# Patient Record
Sex: FEMALE | Race: WHITE | Employment: UNEMPLOYED | URBAN - METROPOLITAN AREA
[De-identification: names, ages, dates, MRNs, and addresses within clinical notes are randomized per-mention and may not be internally consistent; named-entity substitution may affect disease eponyms.]

---

## 2019-06-20 ENCOUNTER — APPOINTMENT (OUTPATIENT)
Dept: GENERAL RADIOLOGY | Age: 84
End: 2019-06-20
Payer: MEDICARE

## 2019-06-20 ENCOUNTER — HOSPITAL ENCOUNTER (EMERGENCY)
Age: 84
Discharge: HOME | End: 2019-06-20
Attending: EMERGENCY MEDICINE
Payer: MEDICARE

## 2019-06-20 VITALS
DIASTOLIC BLOOD PRESSURE: 82 MMHG | HEART RATE: 57 BPM | SYSTOLIC BLOOD PRESSURE: 156 MMHG | TEMPERATURE: 98.1 F | RESPIRATION RATE: 14 BRPM

## 2019-06-20 DIAGNOSIS — M25.571 ACUTE RIGHT ANKLE PAIN: Primary | ICD-10-CM

## 2019-06-20 PROCEDURE — 99283 EMERGENCY DEPT VISIT LOW MDM: CPT

## 2019-06-20 PROCEDURE — 73600 X-RAY EXAM OF ANKLE: CPT

## 2019-06-20 PROCEDURE — 73620 X-RAY EXAM OF FOOT: CPT

## 2019-06-20 RX ORDER — LISINOPRIL 40 MG/1
40 TABLET ORAL DAILY
Status: ON HOLD | COMMUNITY
End: 2019-12-06 | Stop reason: SDUPTHER

## 2019-06-20 ASSESSMENT — ENCOUNTER SYMPTOMS
EYE DISCHARGE: 0
ABDOMINAL DISTENTION: 0
ABDOMINAL PAIN: 0
BACK PAIN: 0
EYE PAIN: 0
FACIAL SWELLING: 0
CHEST TIGHTNESS: 0
SHORTNESS OF BREATH: 0

## 2019-06-20 ASSESSMENT — PAIN SCALES - GENERAL: PAINLEVEL_OUTOF10: 5

## 2019-06-20 NOTE — ED NOTES
Brought into ec by private auto with . Advised by patient that several days ago she was doing flexion/extension type exercises to her feet and experienced  Onset of pain to the dorsal aspect of right foot. claims that pain has remained constant and has increased in intensity over the past 24 hours. States that she now has difficulty walking because weight bearing causes  Increased pain to right foot. ;  . Pain localized to dorsal and bottom of right foot.       Adithya Mcneill RN  06/20/19 7143

## 2019-06-20 NOTE — ED NOTES
Patient re-evaluated  doctor leandro. Patient has difficulty with ambulation  Because of intense right foot pain. Ace bandage and fracture shoe applied to right foot. Patient given a rx of walker for home use.       Lg Fowler RN  06/20/19 0226

## 2019-06-20 NOTE — ED PROVIDER NOTES
EMERGENCY DEPARTMENT ENCOUNTER    Pt Name: Baldomero Shea  MRN: [id number]  [place] 1934  Date of evaluation: 6/20/19  CHIEF COMPLAINT       Chief Complaint   Patient presents with    Leg Pain     right foot after stretching foot several days ago     HISTORY OF PRESENT ILLNESS   HPI   Patient is a 70-year-old female presented to the department secondary to right foot and ankle pain. Patient she was scratching her foot yesterday she possibly hit while outside noticed increased pain localized to her right foot 5 out of 10 on the pain scale. She denied a previous history of gout or osteoarthritis. Patient does admit being out yesterday and sustained a sunburn. Patient ambulates without assistance of wheelchair or cane. Patient denies chest pain, shortness of breath, nausea, vomiting, fevers or chills. REVIEW OF SYSTEMS     Review of Systems   Constitutional: Negative for chills, diaphoresis and fever. HENT: Negative for congestion, ear pain and facial swelling. Eyes: Negative for pain, discharge and visual disturbance. Respiratory: Negative for chest tightness and shortness of breath. Cardiovascular: Negative for chest pain and palpitations. Gastrointestinal: Negative for abdominal distention and abdominal pain. Genitourinary: Negative for difficulty urinating and flank pain. Musculoskeletal: Negative for back pain. Right foot and ankle pain   Skin: Negative for wound. Neurological: Negative for dizziness, light-headedness and headaches.      PASTMEDICAL HISTORY     Past Medical History:   Diagnosis Date    Atrial fibrillation ([address]     CAD (coronary artery disease)     Hyperlipidemia     Hypertension     S/P cardiac cath      SURGICAL HISTORY       Past Surgical History:   Procedure Laterality Date    COLONOSCOPY      PACEMAKER PLACEMENT       CURRENT MEDICATIONS       Discharge Medication List as of 6/20/2019  9:50 AM      CONTINUE these medications which have NOT CHANGED Details   lisinopril (PRINIVIL;ZESTRIL) 40 MG tablet Take 40 mg by mouth dailyHistorical Med      simvastatin (ZOCOR) 20 MG tablet Take 20 mg by mouth nightly      sotalol (BETAPACE) 80 MG tablet Take 80 mg by mouth 2 times daily      warfarin (COUMADIN) 5 MG tablet Take 5 mg by mouth           ALLERGIES     has No Known Allergies. FAMILY HISTORY     has no family status information on file. SOCIAL HISTORY       Social History     Tobacco Use    Smoking status: Former Smoker   Substance Use Topics    Alcohol use: No    Drug use: Never     PHYSICAL EXAM     INITIAL VITALS: BP (!) 156/82   Pulse 57   Temp 98.1 °F (36.7 °C) (Oral)   Resp 14    Physical Exam   Constitutional: She is oriented to person, place, and time. She appears well-developed and well-nourished. No distress. HENT:   Head: Normocephalic and atraumatic. Eyes: Pupils are equal, round, and reactive to light. EOM are normal.   Neck: Normal range of motion. Neck supple. Cardiovascular: Normal rate and regular rhythm. Pulmonary/Chest: Effort normal and breath sounds normal.   Abdominal: Soft. Bowel sounds are normal.   Musculoskeletal: Normal range of motion. She exhibits no edema or deformity. Neurological: She is alert and oriented to person, place, and time. Skin: Skin is dry. Capillary refill takes 2 to 3 seconds. She is not diaphoretic. Psychiatric: She has a normal mood and affect. Nursing note and vitals reviewed. MEDICAL DECISION MAKING:   The patient was seen and examined. Patient is a 40-year-old female who presented to the emergency department secondary to right foot pain. 2 diagnoses include but not limited to occult fracture versus dislocation versus musculoskeletal strain. X-rays obtained of the right foot and ankle. Patient was offered analgesia she declined. Imaging no acute findings, patient unable to bear weight she was placed in a Ace wrap and a postop shoe.   She did not feel comfortable discharge home with crutches but she did request a discharge prescription for a wheelchair. Discharged home with outpatient follow-up and given parameters for return to the emergency department. CRITICAL CARE:              NIH STROKE SCALE:            PROCEDURES:    Procedures    DIAGNOSTIC RESULTS   EKG:All EKG's are interpreted by the Emergency Department Physician who either signs or Co-signs this chart in the absence of a cardiologist.        RADIOLOGY:All plain film, CT, MRI, and formal ultrasound images (except ED bedside ultrasound) are read by the radiologist, see reports below, unless otherwisenoted in MDM or here. XR ANKLE RIGHT (2 VIEWS)   Final Result   1. No acute osseous abnormality of the right foot and ankle. 2. Moderate ankle soft tissue swelling. 3. Diffuse bony demineralization. XR FOOT RIGHT (2 VIEWS)   Final Result   1. No acute osseous abnormality of the right foot and ankle. 2. Moderate ankle soft tissue swelling. 3. Diffuse bony demineralization. LABS: All lab results were reviewed by myself, and all abnormals are listed below. Labs Reviewed - No data to display    EMERGENCY DEPARTMENTCOURSE:         Vitals:    Vitals:    06/20/19 0829   BP: (!) 156/82   Pulse: 57   Resp: 14   Temp: 98.1 °F (36.7 °C)   TempSrc: Oral       The patient was given the following medications while in the emergency department:  No orders of the defined types were placed in this encounter. CONSULTS:  None    FINAL IMPRESSION      1.  Acute right ankle pain          DISPOSITION/PLAN   DISPOSITION        PATIENT REFERRED TO:  Walt Montilla MD  [address] [place] [address]  [phone]    Schedule an appointment as soon as possible for a visit       DISCHARGE MEDICATIONS:  Discharge Medication List as of 6/20/2019  5:91 AM        Itzel Bui MD  Attending Emergency Physician                    Itzel Bui MD  30/94/48 Ana Luisa Ha MD  07/86/76 0186

## 2019-12-05 ENCOUNTER — APPOINTMENT (OUTPATIENT)
Dept: GENERAL RADIOLOGY | Age: 84
DRG: 308 | End: 2019-12-05
Payer: MEDICARE

## 2019-12-05 ENCOUNTER — HOSPITAL ENCOUNTER (INPATIENT)
Age: 84
LOS: 1 days | Discharge: HOME | DRG: 308 | End: 2019-12-06
Attending: EMERGENCY MEDICINE | Admitting: INTERNAL MEDICINE
Payer: MEDICARE

## 2019-12-05 ENCOUNTER — APPOINTMENT (OUTPATIENT)
Dept: CT IMAGING | Age: 84
DRG: 308 | End: 2019-12-05
Payer: MEDICARE

## 2019-12-05 DIAGNOSIS — J44.1 COPD EXACERBATION (HCC): ICD-10-CM

## 2019-12-05 DIAGNOSIS — I48.91 ATRIAL FIBRILLATION WITH RVR (HCC): Primary | ICD-10-CM

## 2019-12-05 PROBLEM — I50.31 ACUTE DIASTOLIC CHF (CONGESTIVE HEART FAILURE) (HCC): Status: ACTIVE | Noted: 2019-12-05

## 2019-12-05 PROBLEM — Z91.14 NONCOMPLIANCE WITH MEDICATIONS: Status: ACTIVE | Noted: 2019-12-05

## 2019-12-05 LAB
ABSOLUTE EOS #: 0.03 K/UL (ref 0–0.44)
ABSOLUTE IMMATURE GRANULOCYTE: 0.04 K/UL (ref 0–0.3)
ABSOLUTE LYMPH #: 0.86 K/UL (ref 1.1–3.7)
ABSOLUTE MONO #: 0.6 K/UL (ref 0.1–1.2)
ANION GAP SERPL CALCULATED.3IONS-SCNC: 14 MMOL/L (ref 9–17)
BASOPHILS # BLD: 0 % (ref 0–2)
BASOPHILS ABSOLUTE: 0.03 K/UL (ref 0–0.2)
BNP INTERPRETATION: ABNORMAL
BUN BLDV-MCNC: 13 MG/DL (ref 8–23)
BUN/CREAT BLD: 17 (ref 9–20)
CALCIUM SERPL-MCNC: 9.7 MG/DL (ref 8.6–10.4)
CHLORIDE BLD-SCNC: 106 MMOL/L (ref 98–107)
CO2: 21 MMOL/L (ref 20–31)
CREAT SERPL-MCNC: 0.76 MG/DL (ref 0.5–0.9)
DIFFERENTIAL TYPE: ABNORMAL
EOSINOPHILS RELATIVE PERCENT: 0 % (ref 1–4)
GFR AFRICAN AMERICAN: >60 ML/MIN
GFR NON-AFRICAN AMERICAN: >60 ML/MIN
GFR SERPL CREATININE-BSD FRML MDRD: ABNORMAL ML/MIN/{1.73_M2}
GFR SERPL CREATININE-BSD FRML MDRD: ABNORMAL ML/MIN/{1.73_M2}
GLUCOSE BLD-MCNC: 157 MG/DL (ref 70–99)
HCT VFR BLD CALC: 48.2 % (ref 36.3–47.1)
HEMOGLOBIN: 15.4 G/DL (ref 11.9–15.1)
IMMATURE GRANULOCYTES: 0 %
INR BLD: 1.7
LV EF: 55 %
LVEF MODALITY: NORMAL
LYMPHOCYTES # BLD: 10 % (ref 24–43)
MCH RBC QN AUTO: 32.2 PG (ref 25.2–33.5)
MCHC RBC AUTO-ENTMCNC: 32 G/DL (ref 28.4–34.8)
MCV RBC AUTO: 100.8 FL (ref 82.6–102.9)
MONOCYTES # BLD: 7 % (ref 3–12)
MYOGLOBIN: 73 NG/ML (ref 25–58)
NRBC AUTOMATED: 0 PER 100 WBC
PDW BLD-RTO: 13.6 % (ref 11.8–14.4)
PLATELET # BLD: 160 K/UL (ref 138–453)
PLATELET ESTIMATE: ABNORMAL
PMV BLD AUTO: 12 FL (ref 8.1–13.5)
POTASSIUM SERPL-SCNC: 4.1 MMOL/L (ref 3.7–5.3)
PRO-BNP: 3624 PG/ML
PROTHROMBIN TIME: 17.4 SEC (ref 9.7–11.6)
RBC # BLD: 4.78 M/UL (ref 3.95–5.11)
RBC # BLD: ABNORMAL 10*6/UL
SEG NEUTROPHILS: 83 % (ref 36–65)
SEGMENTED NEUTROPHILS ABSOLUTE COUNT: 7.44 K/UL (ref 1.5–8.1)
SODIUM BLD-SCNC: 141 MMOL/L (ref 135–144)
TROPONIN INTERP: ABNORMAL
TROPONIN T: ABNORMAL NG/ML
TROPONIN, HIGH SENSITIVITY: 23 NG/L (ref 0–14)
TROPONIN, HIGH SENSITIVITY: 28 NG/L (ref 0–14)
TROPONIN, HIGH SENSITIVITY: 28 NG/L (ref 0–14)
TROPONIN, HIGH SENSITIVITY: 32 NG/L (ref 0–14)
WBC # BLD: 9 K/UL (ref 3.5–11.3)
WBC # BLD: ABNORMAL 10*3/UL

## 2019-12-05 PROCEDURE — 99222 1ST HOSP IP/OBS MODERATE 55: CPT | Performed by: INTERNAL MEDICINE

## 2019-12-05 PROCEDURE — 2060000000 HC ICU INTERMEDIATE R&B

## 2019-12-05 PROCEDURE — 6370000000 HC RX 637 (ALT 250 FOR IP): Performed by: NURSE PRACTITIONER

## 2019-12-05 PROCEDURE — 6360000002 HC RX W HCPCS: Performed by: NURSE PRACTITIONER

## 2019-12-05 PROCEDURE — 85025 COMPLETE CBC W/AUTO DIFF WBC: CPT

## 2019-12-05 PROCEDURE — 96365 THER/PROPH/DIAG IV INF INIT: CPT

## 2019-12-05 PROCEDURE — 2580000003 HC RX 258: Performed by: NURSE PRACTITIONER

## 2019-12-05 PROCEDURE — 83880 ASSAY OF NATRIURETIC PEPTIDE: CPT

## 2019-12-05 PROCEDURE — 2500000003 HC RX 250 WO HCPCS: Performed by: NURSE PRACTITIONER

## 2019-12-05 PROCEDURE — 99285 EMERGENCY DEPT VISIT HI MDM: CPT

## 2019-12-05 PROCEDURE — 36415 COLL VENOUS BLD VENIPUNCTURE: CPT

## 2019-12-05 PROCEDURE — 70450 CT HEAD/BRAIN W/O DYE: CPT

## 2019-12-05 PROCEDURE — 6360000002 HC RX W HCPCS: Performed by: INTERNAL MEDICINE

## 2019-12-05 PROCEDURE — 93005 ELECTROCARDIOGRAM TRACING: CPT | Performed by: NURSE PRACTITIONER

## 2019-12-05 PROCEDURE — 71045 X-RAY EXAM CHEST 1 VIEW: CPT

## 2019-12-05 PROCEDURE — 83874 ASSAY OF MYOGLOBIN: CPT

## 2019-12-05 PROCEDURE — 85610 PROTHROMBIN TIME: CPT

## 2019-12-05 PROCEDURE — 84484 ASSAY OF TROPONIN QUANT: CPT

## 2019-12-05 PROCEDURE — 80048 BASIC METABOLIC PNL TOTAL CA: CPT

## 2019-12-05 PROCEDURE — 96375 TX/PRO/DX INJ NEW DRUG ADDON: CPT

## 2019-12-05 PROCEDURE — 96376 TX/PRO/DX INJ SAME DRUG ADON: CPT

## 2019-12-05 PROCEDURE — APPSS180 APP SPLIT SHARED TIME > 60 MINUTES: Performed by: NURSE PRACTITIONER

## 2019-12-05 PROCEDURE — 93306 TTE W/DOPPLER COMPLETE: CPT

## 2019-12-05 RX ORDER — SIMVASTATIN 20 MG
20 TABLET ORAL NIGHTLY
Status: ON HOLD | COMMUNITY
End: 2019-12-06 | Stop reason: HOSPADM

## 2019-12-05 RX ORDER — SODIUM CHLORIDE 0.9 % (FLUSH) 0.9 %
10 SYRINGE (ML) INJECTION EVERY 12 HOURS SCHEDULED
Status: DISCONTINUED | OUTPATIENT
Start: 2019-12-05 | End: 2019-12-06 | Stop reason: HOSPADM

## 2019-12-05 RX ORDER — METOPROLOL TARTRATE 5 MG/5ML
5 INJECTION INTRAVENOUS EVERY 6 HOURS PRN
Status: DISCONTINUED | OUTPATIENT
Start: 2019-12-05 | End: 2019-12-06 | Stop reason: HOSPADM

## 2019-12-05 RX ORDER — NICOTINE 21 MG/24HR
1 PATCH, TRANSDERMAL 24 HOURS TRANSDERMAL DAILY PRN
Status: DISCONTINUED | OUTPATIENT
Start: 2019-12-05 | End: 2019-12-06 | Stop reason: HOSPADM

## 2019-12-05 RX ORDER — LORAZEPAM 2 MG/ML
0.5 INJECTION INTRAMUSCULAR ONCE
Status: DISCONTINUED | OUTPATIENT
Start: 2019-12-05 | End: 2019-12-05

## 2019-12-05 RX ORDER — VERAPAMIL HYDROCHLORIDE 120 MG/1
120 TABLET, FILM COATED ORAL 3 TIMES DAILY
COMMUNITY
End: 2019-12-05

## 2019-12-05 RX ORDER — DILTIAZEM HYDROCHLORIDE 5 MG/ML
5 INJECTION INTRAVENOUS ONCE
Status: COMPLETED | OUTPATIENT
Start: 2019-12-05 | End: 2019-12-05

## 2019-12-05 RX ORDER — FUROSEMIDE 10 MG/ML
20 INJECTION INTRAMUSCULAR; INTRAVENOUS DAILY
Status: DISCONTINUED | OUTPATIENT
Start: 2019-12-05 | End: 2019-12-06

## 2019-12-05 RX ORDER — ALBUTEROL SULFATE 2.5 MG/3ML
2.5 SOLUTION RESPIRATORY (INHALATION)
Status: DISCONTINUED | OUTPATIENT
Start: 2019-12-05 | End: 2019-12-06 | Stop reason: HOSPADM

## 2019-12-05 RX ORDER — METOPROLOL TARTRATE 50 MG/1
50 TABLET, FILM COATED ORAL 2 TIMES DAILY
Status: ON HOLD | COMMUNITY
End: 2019-12-06 | Stop reason: HOSPADM

## 2019-12-05 RX ORDER — ACETAMINOPHEN 325 MG/1
650 TABLET ORAL EVERY 6 HOURS PRN
Status: DISCONTINUED | OUTPATIENT
Start: 2019-12-05 | End: 2019-12-06 | Stop reason: HOSPADM

## 2019-12-05 RX ORDER — ALBUTEROL SULFATE 90 UG/1
2 AEROSOL, METERED RESPIRATORY (INHALATION)
Status: DISCONTINUED | OUTPATIENT
Start: 2019-12-05 | End: 2019-12-05

## 2019-12-05 RX ORDER — ONDANSETRON 4 MG/1
4 TABLET, ORALLY DISINTEGRATING ORAL EVERY 6 HOURS PRN
Status: DISCONTINUED | OUTPATIENT
Start: 2019-12-05 | End: 2019-12-05

## 2019-12-05 RX ORDER — WARFARIN SODIUM 5 MG/1
5 TABLET ORAL DAILY
Status: DISCONTINUED | OUTPATIENT
Start: 2019-12-05 | End: 2019-12-06 | Stop reason: HOSPADM

## 2019-12-05 RX ORDER — MORPHINE SULFATE 4 MG/ML
4 INJECTION, SOLUTION INTRAMUSCULAR; INTRAVENOUS ONCE
Status: DISCONTINUED | OUTPATIENT
Start: 2019-12-05 | End: 2019-12-05

## 2019-12-05 RX ORDER — METOPROLOL SUCCINATE 100 MG/1
100 TABLET, EXTENDED RELEASE ORAL DAILY
COMMUNITY
End: 2019-12-05

## 2019-12-05 RX ORDER — DILTIAZEM HYDROCHLORIDE 240 MG/1
240 CAPSULE, COATED, EXTENDED RELEASE ORAL DAILY
COMMUNITY
End: 2019-12-05

## 2019-12-05 RX ORDER — SODIUM CHLORIDE 0.9 % (FLUSH) 0.9 %
10 SYRINGE (ML) INJECTION PRN
Status: DISCONTINUED | OUTPATIENT
Start: 2019-12-05 | End: 2019-12-06 | Stop reason: HOSPADM

## 2019-12-05 RX ORDER — DOXYCYCLINE HYCLATE 50 MG/1
50 CAPSULE ORAL DAILY
Status: ON HOLD | COMMUNITY
End: 2021-09-10 | Stop reason: HOSPADM

## 2019-12-05 RX ORDER — SIMVASTATIN 20 MG
20 TABLET ORAL NIGHTLY
Status: DISCONTINUED | OUTPATIENT
Start: 2019-12-05 | End: 2019-12-06 | Stop reason: DRUGHIGH

## 2019-12-05 RX ORDER — IPRATROPIUM BROMIDE AND ALBUTEROL SULFATE 2.5; .5 MG/3ML; MG/3ML
1 SOLUTION RESPIRATORY (INHALATION)
Status: DISCONTINUED | OUTPATIENT
Start: 2019-12-05 | End: 2019-12-05

## 2019-12-05 RX ORDER — METHYLPREDNISOLONE SODIUM SUCCINATE 125 MG/2ML
125 INJECTION, POWDER, LYOPHILIZED, FOR SOLUTION INTRAMUSCULAR; INTRAVENOUS ONCE
Status: COMPLETED | OUTPATIENT
Start: 2019-12-05 | End: 2019-12-05

## 2019-12-05 RX ORDER — ONDANSETRON 2 MG/ML
4 INJECTION INTRAMUSCULAR; INTRAVENOUS EVERY 6 HOURS PRN
Status: DISCONTINUED | OUTPATIENT
Start: 2019-12-05 | End: 2019-12-05

## 2019-12-05 RX ORDER — ONDANSETRON 2 MG/ML
4 INJECTION INTRAMUSCULAR; INTRAVENOUS EVERY 6 HOURS PRN
Status: DISCONTINUED | OUTPATIENT
Start: 2019-12-05 | End: 2019-12-05 | Stop reason: CLARIF

## 2019-12-05 RX ORDER — DILTIAZEM HYDROCHLORIDE 5 MG/ML
10 INJECTION INTRAVENOUS ONCE
Status: COMPLETED | OUTPATIENT
Start: 2019-12-05 | End: 2019-12-05

## 2019-12-05 RX ORDER — ACETAMINOPHEN 325 MG/1
650 TABLET ORAL EVERY 6 HOURS PRN
Status: ON HOLD | COMMUNITY
End: 2021-09-10

## 2019-12-05 RX ORDER — HYDROCHLOROTHIAZIDE 25 MG/1
25 TABLET ORAL DAILY
Status: ON HOLD | COMMUNITY
End: 2021-09-10 | Stop reason: HOSPADM

## 2019-12-05 RX ORDER — ALBUTEROL SULFATE 2.5 MG/3ML
5 SOLUTION RESPIRATORY (INHALATION)
Status: DISCONTINUED | OUTPATIENT
Start: 2019-12-05 | End: 2019-12-05

## 2019-12-05 RX ADMIN — ENOXAPARIN SODIUM 50 MG: 60 INJECTION SUBCUTANEOUS at 18:52

## 2019-12-05 RX ADMIN — DILTIAZEM HYDROCHLORIDE 10 MG: 5 INJECTION INTRAVENOUS at 08:25

## 2019-12-05 RX ADMIN — FUROSEMIDE 20 MG: 10 INJECTION, SOLUTION INTRAMUSCULAR; INTRAVENOUS at 13:31

## 2019-12-05 RX ADMIN — DILTIAZEM HYDROCHLORIDE 5 MG: 5 INJECTION INTRAVENOUS at 10:13

## 2019-12-05 RX ADMIN — DRONEDARONE 400 MG: 400 TABLET, FILM COATED ORAL at 17:06

## 2019-12-05 RX ADMIN — METOPROLOL TARTRATE 5 MG: 5 INJECTION INTRAVENOUS at 13:31

## 2019-12-05 RX ADMIN — ENOXAPARIN SODIUM 30 MG: 30 INJECTION SUBCUTANEOUS at 17:06

## 2019-12-05 RX ADMIN — DILTIAZEM HYDROCHLORIDE 15 MG/HR: 5 INJECTION INTRAVENOUS at 18:51

## 2019-12-05 RX ADMIN — METHYLPREDNISOLONE SODIUM SUCCINATE 125 MG: 125 INJECTION, POWDER, FOR SOLUTION INTRAMUSCULAR; INTRAVENOUS at 08:24

## 2019-12-05 RX ADMIN — DILTIAZEM HYDROCHLORIDE 5 MG/HR: 5 INJECTION INTRAVENOUS at 10:15

## 2019-12-05 RX ADMIN — WARFARIN SODIUM 5 MG: 5 TABLET ORAL at 17:06

## 2019-12-05 RX ADMIN — SIMVASTATIN 20 MG: 20 TABLET, FILM COATED ORAL at 20:31

## 2019-12-05 ASSESSMENT — ENCOUNTER SYMPTOMS
DIARRHEA: 0
ABDOMINAL PAIN: 0
SINUS PRESSURE: 0
CHEST TIGHTNESS: 1
SHORTNESS OF BREATH: 1
COLOR CHANGE: 0
COUGH: 0
VOMITING: 0
NAUSEA: 0
RHINORRHEA: 0
SORE THROAT: 0
WHEEZING: 1

## 2019-12-05 ASSESSMENT — PAIN SCALES - GENERAL: PAINLEVEL_OUTOF10: 0

## 2019-12-06 VITALS
WEIGHT: 178.8 LBS | SYSTOLIC BLOOD PRESSURE: 109 MMHG | HEART RATE: 89 BPM | OXYGEN SATURATION: 93 % | RESPIRATION RATE: 16 BRPM | HEIGHT: 66 IN | TEMPERATURE: 97 F | DIASTOLIC BLOOD PRESSURE: 67 MMHG | BODY MASS INDEX: 28.73 KG/M2

## 2019-12-06 LAB
ALBUMIN SERPL-MCNC: 3.9 G/DL (ref 3.5–5.2)
ALBUMIN/GLOBULIN RATIO: ABNORMAL (ref 1–2.5)
ALP BLD-CCNC: 61 U/L (ref 35–104)
ALT SERPL-CCNC: 30 U/L (ref 5–33)
ANION GAP SERPL CALCULATED.3IONS-SCNC: 14 MMOL/L (ref 9–17)
AST SERPL-CCNC: 27 U/L
BILIRUB SERPL-MCNC: 0.7 MG/DL (ref 0.3–1.2)
BNP INTERPRETATION: ABNORMAL
BUN BLDV-MCNC: 17 MG/DL (ref 8–23)
BUN/CREAT BLD: 21 (ref 9–20)
CALCIUM SERPL-MCNC: 9.3 MG/DL (ref 8.6–10.4)
CHLORIDE BLD-SCNC: 102 MMOL/L (ref 98–107)
CHOLESTEROL/HDL RATIO: 2.9
CHOLESTEROL: 165 MG/DL
CO2: 21 MMOL/L (ref 20–31)
CREAT SERPL-MCNC: 0.82 MG/DL (ref 0.5–0.9)
EKG ATRIAL RATE: 68 BPM
EKG Q-T INTERVAL: 292 MS
EKG QRS DURATION: 116 MS
EKG QTC CALCULATION (BAZETT): 450 MS
EKG R AXIS: -55 DEGREES
EKG T AXIS: 112 DEGREES
EKG VENTRICULAR RATE: 143 BPM
ESTIMATED AVERAGE GLUCOSE: 131 MG/DL
GFR AFRICAN AMERICAN: >60 ML/MIN
GFR NON-AFRICAN AMERICAN: >60 ML/MIN
GFR SERPL CREATININE-BSD FRML MDRD: ABNORMAL ML/MIN/{1.73_M2}
GFR SERPL CREATININE-BSD FRML MDRD: ABNORMAL ML/MIN/{1.73_M2}
GLUCOSE BLD-MCNC: 194 MG/DL (ref 70–99)
HBA1C MFR BLD: 6.2 % (ref 4–6)
HDLC SERPL-MCNC: 57 MG/DL
INR BLD: 2.2
LDL CHOLESTEROL: 91 MG/DL (ref 0–130)
MAGNESIUM: 2 MG/DL (ref 1.6–2.6)
POTASSIUM SERPL-SCNC: 4 MMOL/L (ref 3.7–5.3)
PRO-BNP: 2287 PG/ML
PROTHROMBIN TIME: 22 SEC (ref 9.7–11.6)
SODIUM BLD-SCNC: 137 MMOL/L (ref 135–144)
TOTAL PROTEIN: 6.2 G/DL (ref 6.4–8.3)
TRIGL SERPL-MCNC: 84 MG/DL
TSH SERPL DL<=0.05 MIU/L-ACNC: 2.49 MIU/L (ref 0.3–5)
VLDLC SERPL CALC-MCNC: NORMAL MG/DL (ref 1–30)

## 2019-12-06 PROCEDURE — 6360000002 HC RX W HCPCS: Performed by: INTERNAL MEDICINE

## 2019-12-06 PROCEDURE — 2500000003 HC RX 250 WO HCPCS: Performed by: NURSE PRACTITIONER

## 2019-12-06 PROCEDURE — 83735 ASSAY OF MAGNESIUM: CPT

## 2019-12-06 PROCEDURE — 85610 PROTHROMBIN TIME: CPT

## 2019-12-06 PROCEDURE — 6370000000 HC RX 637 (ALT 250 FOR IP): Performed by: NURSE PRACTITIONER

## 2019-12-06 PROCEDURE — 80061 LIPID PANEL: CPT

## 2019-12-06 PROCEDURE — 6370000000 HC RX 637 (ALT 250 FOR IP): Performed by: INTERNAL MEDICINE

## 2019-12-06 PROCEDURE — 84443 ASSAY THYROID STIM HORMONE: CPT

## 2019-12-06 PROCEDURE — 99239 HOSP IP/OBS DSCHRG MGMT >30: CPT | Performed by: INTERNAL MEDICINE

## 2019-12-06 PROCEDURE — 6360000002 HC RX W HCPCS: Performed by: NURSE PRACTITIONER

## 2019-12-06 PROCEDURE — 83880 ASSAY OF NATRIURETIC PEPTIDE: CPT

## 2019-12-06 PROCEDURE — 80053 COMPREHEN METABOLIC PANEL: CPT

## 2019-12-06 PROCEDURE — 83036 HEMOGLOBIN GLYCOSYLATED A1C: CPT

## 2019-12-06 PROCEDURE — 2580000003 HC RX 258: Performed by: NURSE PRACTITIONER

## 2019-12-06 PROCEDURE — 36415 COLL VENOUS BLD VENIPUNCTURE: CPT

## 2019-12-06 RX ORDER — LISINOPRIL 40 MG/1
20 TABLET ORAL DAILY
Qty: 30 TABLET | Refills: 3 | Status: ON HOLD | OUTPATIENT
Start: 2019-12-06 | End: 2021-09-10 | Stop reason: SDUPTHER

## 2019-12-06 RX ORDER — VERAPAMIL HYDROCHLORIDE 240 MG/1
240 TABLET, FILM COATED, EXTENDED RELEASE ORAL NIGHTLY
Status: DISCONTINUED | OUTPATIENT
Start: 2019-12-06 | End: 2019-12-06 | Stop reason: HOSPADM

## 2019-12-06 RX ORDER — ATORVASTATIN CALCIUM 20 MG/1
20 TABLET, FILM COATED ORAL NIGHTLY
Qty: 30 TABLET | Refills: 3 | Status: SHIPPED | OUTPATIENT
Start: 2019-12-06

## 2019-12-06 RX ORDER — SIMVASTATIN 10 MG
10 TABLET ORAL NIGHTLY
Status: DISCONTINUED | OUTPATIENT
Start: 2019-12-06 | End: 2019-12-06

## 2019-12-06 RX ORDER — DILTIAZEM HYDROCHLORIDE 180 MG/1
180 CAPSULE, COATED, EXTENDED RELEASE ORAL DAILY
Status: DISCONTINUED | OUTPATIENT
Start: 2019-12-06 | End: 2019-12-06

## 2019-12-06 RX ORDER — ATORVASTATIN CALCIUM 20 MG/1
20 TABLET, FILM COATED ORAL NIGHTLY
Status: DISCONTINUED | OUTPATIENT
Start: 2019-12-06 | End: 2019-12-06 | Stop reason: HOSPADM

## 2019-12-06 RX ORDER — VERAPAMIL HYDROCHLORIDE 240 MG/1
240 TABLET, FILM COATED, EXTENDED RELEASE ORAL NIGHTLY
Qty: 30 TABLET | Refills: 3 | Status: SHIPPED | OUTPATIENT
Start: 2019-12-07

## 2019-12-06 RX ADMIN — DRONEDARONE 400 MG: 400 TABLET, FILM COATED ORAL at 15:41

## 2019-12-06 RX ADMIN — DILTIAZEM HYDROCHLORIDE 15 MG/HR: 5 INJECTION INTRAVENOUS at 04:27

## 2019-12-06 RX ADMIN — DRONEDARONE 400 MG: 400 TABLET, FILM COATED ORAL at 09:31

## 2019-12-06 RX ADMIN — FUROSEMIDE 20 MG: 10 INJECTION, SOLUTION INTRAMUSCULAR; INTRAVENOUS at 09:32

## 2019-12-06 RX ADMIN — VERAPAMIL HYDROCHLORIDE 240 MG: 240 TABLET, FILM COATED, EXTENDED RELEASE ORAL at 12:51

## 2019-12-06 RX ADMIN — ENOXAPARIN SODIUM 80 MG: 80 INJECTION SUBCUTANEOUS at 09:31

## 2019-12-06 ASSESSMENT — PAIN SCALES - GENERAL
PAINLEVEL_OUTOF10: 0

## 2019-12-07 ENCOUNTER — HOSPITAL ENCOUNTER (OUTPATIENT)
Age: 84
Discharge: HOME | End: 2019-12-07
Payer: MEDICARE

## 2019-12-07 LAB
INR BLD: 4.2
PROTHROMBIN TIME: 41.3 SEC (ref 9.7–11.6)

## 2019-12-07 PROCEDURE — 85610 PROTHROMBIN TIME: CPT

## 2019-12-07 PROCEDURE — 36415 COLL VENOUS BLD VENIPUNCTURE: CPT

## 2020-08-27 ENCOUNTER — TELEPHONE (OUTPATIENT)
Dept: PHARMACY | Age: 85
End: 2020-08-27

## 2020-08-27 NOTE — TELEPHONE ENCOUNTER
Received fax from [facility] with INR results. Warfarin is not managed by this clinic. [facility], she confirmed patient is managed there. Faxed results to her office at [phone].

## 2021-09-09 ENCOUNTER — APPOINTMENT (OUTPATIENT)
Dept: CT IMAGING | Age: 86
End: 2021-09-09
Payer: MEDICARE

## 2021-09-09 ENCOUNTER — HOSPITAL ENCOUNTER (OUTPATIENT)
Age: 86
Setting detail: OBSERVATION
Discharge: NURSING, REHAB OR LONG-TERM CARE | End: 2021-09-13
Attending: EMERGENCY MEDICINE | Admitting: FAMILY MEDICINE
Payer: MEDICARE

## 2021-09-09 ENCOUNTER — APPOINTMENT (OUTPATIENT)
Dept: GENERAL RADIOLOGY | Age: 86
End: 2021-09-09
Payer: MEDICARE
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
## 2021-09-09 DIAGNOSIS — M25.571 ACUTE RIGHT ANKLE PAIN: ICD-10-CM

## 2021-09-09 DIAGNOSIS — R53.1 GENERALIZED WEAKNESS: ICD-10-CM

## 2021-09-09 DIAGNOSIS — R29.6 FREQUENT FALLS: ICD-10-CM

## 2021-09-09 LAB
-: ABNORMAL
ABSOLUTE EOS #: 0.1 K/UL (ref 0–0.44)
ABSOLUTE IMMATURE GRANULOCYTE: 0.04 K/UL (ref 0–0.3)
ABSOLUTE LYMPH #: 1.05 K/UL (ref 1.1–3.7)
ABSOLUTE MONO #: 0.92 K/UL (ref 0.1–1.2)
AMORPHOUS: ABNORMAL
ANION GAP SERPL CALCULATED.3IONS-SCNC: 10 MMOL/L (ref 9–17)
BACTERIA: ABNORMAL
BASOPHILS # BLD: 0 % (ref 0–2)
BASOPHILS ABSOLUTE: 0.03 K/UL (ref 0–0.2)
BILIRUBIN URINE: NEGATIVE
BUN BLDV-MCNC: 13 MG/DL (ref 8–23)
BUN/CREAT BLD: 22 (ref 9–20)
CALCIUM SERPL-MCNC: 9.8 MG/DL (ref 8.6–10.4)
CASTS UA: ABNORMAL /LPF
CASTS UA: ABNORMAL /LPF
CHLORIDE BLD-SCNC: 103 MMOL/L (ref 98–107)
CO2: 25 MMOL/L (ref 20–31)
COLOR: ABNORMAL
COMMENT UA: ABNORMAL
CREAT SERPL-MCNC: 0.58 MG/DL (ref 0.5–0.9)
CRYSTALS, UA: ABNORMAL /HPF
DIFFERENTIAL TYPE: ABNORMAL
EOSINOPHILS RELATIVE PERCENT: 1 % (ref 1–4)
EPITHELIAL CELLS UA: ABNORMAL /HPF (ref 0–5)
GFR AFRICAN AMERICAN: >60 ML/MIN
GFR NON-AFRICAN AMERICAN: >60 ML/MIN
GFR SERPL CREATININE-BSD FRML MDRD: ABNORMAL ML/MIN/{1.73_M2}
GFR SERPL CREATININE-BSD FRML MDRD: ABNORMAL ML/MIN/{1.73_M2}
GLUCOSE BLD-MCNC: 103 MG/DL (ref 70–99)
GLUCOSE URINE: NEGATIVE
HCT VFR BLD CALC: 40.7 % (ref 36.3–47.1)
HEMOGLOBIN: 13.2 G/DL (ref 11.9–15.1)
IMMATURE GRANULOCYTES: 1 %
INR BLD: 1.5
KETONES, URINE: ABNORMAL
LEUKOCYTE ESTERASE, URINE: NEGATIVE
LYMPHOCYTES # BLD: 13 % (ref 24–43)
MCH RBC QN AUTO: 31.7 PG (ref 25.2–33.5)
MCHC RBC AUTO-ENTMCNC: 32.4 G/DL (ref 28.4–34.8)
MCV RBC AUTO: 97.6 FL (ref 82.6–102.9)
MONOCYTES # BLD: 11 % (ref 3–12)
MUCUS: ABNORMAL
NITRITE, URINE: NEGATIVE
NRBC AUTOMATED: 0 PER 100 WBC
OTHER OBSERVATIONS UA: ABNORMAL
PDW BLD-RTO: 12.5 % (ref 11.8–14.4)
PH UA: 7.5 (ref 5–8)
PLATELET # BLD: 145 K/UL (ref 138–453)
PLATELET ESTIMATE: ABNORMAL
PMV BLD AUTO: 11.3 FL (ref 8.1–13.5)
POTASSIUM SERPL-SCNC: 3.7 MMOL/L (ref 3.7–5.3)
PROTEIN UA: NEGATIVE
PROTHROMBIN TIME: 18 SEC (ref 11.5–14.2)
RBC # BLD: 4.17 M/UL (ref 3.95–5.11)
RBC # BLD: ABNORMAL 10*6/UL
RBC UA: ABNORMAL /HPF (ref 0–2)
RENAL EPITHELIAL, UA: ABNORMAL /HPF
SEG NEUTROPHILS: 74 % (ref 36–65)
SEGMENTED NEUTROPHILS ABSOLUTE COUNT: 6.18 K/UL (ref 1.5–8.1)
SODIUM BLD-SCNC: 138 MMOL/L (ref 135–144)
SPECIFIC GRAVITY UA: 1.01 (ref 1–1.03)
TRICHOMONAS: ABNORMAL
TURBIDITY: ABNORMAL
URINE HGB: NEGATIVE
UROBILINOGEN, URINE: NORMAL
WBC # BLD: 8.3 K/UL (ref 3.5–11.3)
WBC # BLD: ABNORMAL 10*3/UL
WBC UA: ABNORMAL /HPF (ref 0–5)
YEAST: ABNORMAL

## 2021-09-09 PROCEDURE — 85610 PROTHROMBIN TIME: CPT

## 2021-09-09 PROCEDURE — 81001 URINALYSIS AUTO W/SCOPE: CPT

## 2021-09-09 PROCEDURE — 99284 EMERGENCY DEPT VISIT MOD MDM: CPT

## 2021-09-09 PROCEDURE — 73610 X-RAY EXAM OF ANKLE: CPT

## 2021-09-09 PROCEDURE — 74177 CT ABD & PELVIS W/CONTRAST: CPT

## 2021-09-09 PROCEDURE — 71250 CT THORAX DX C-: CPT

## 2021-09-09 PROCEDURE — 1200000000 HC SEMI PRIVATE

## 2021-09-09 PROCEDURE — 99219 PR INITIAL OBSERVATION CARE/DAY 50 MINUTES: CPT | Performed by: NURSE PRACTITIONER

## 2021-09-09 PROCEDURE — G0378 HOSPITAL OBSERVATION PER HR: HCPCS

## 2021-09-09 PROCEDURE — 2580000003 HC RX 258: Performed by: EMERGENCY MEDICINE

## 2021-09-09 PROCEDURE — 80048 BASIC METABOLIC PNL TOTAL CA: CPT

## 2021-09-09 PROCEDURE — 85025 COMPLETE CBC W/AUTO DIFF WBC: CPT

## 2021-09-09 PROCEDURE — 72125 CT NECK SPINE W/O DYE: CPT

## 2021-09-09 PROCEDURE — 70450 CT HEAD/BRAIN W/O DYE: CPT

## 2021-09-09 PROCEDURE — 72131 CT LUMBAR SPINE W/O DYE: CPT

## 2021-09-09 PROCEDURE — 6360000004 HC RX CONTRAST MEDICATION: Performed by: EMERGENCY MEDICINE

## 2021-09-09 PROCEDURE — 72128 CT CHEST SPINE W/O DYE: CPT

## 2021-09-09 PROCEDURE — 71045 X-RAY EXAM CHEST 1 VIEW: CPT

## 2021-09-09 RX ORDER — ACETAMINOPHEN 650 MG/1
650 SUPPOSITORY RECTAL EVERY 6 HOURS PRN
Status: DISCONTINUED | OUTPATIENT
Start: 2021-09-09 | End: 2021-09-13 | Stop reason: HOSPADM

## 2021-09-09 RX ORDER — SODIUM CHLORIDE 0.9 % (FLUSH) 0.9 %
5-40 SYRINGE (ML) INJECTION EVERY 12 HOURS SCHEDULED
Status: DISCONTINUED | OUTPATIENT
Start: 2021-09-09 | End: 2021-09-13 | Stop reason: HOSPADM

## 2021-09-09 RX ORDER — HYDROCHLOROTHIAZIDE 25 MG/1
25 TABLET ORAL DAILY
Status: DISCONTINUED | OUTPATIENT
Start: 2021-09-10 | End: 2021-09-10

## 2021-09-09 RX ORDER — VERAPAMIL HYDROCHLORIDE 240 MG/1
240 TABLET, FILM COATED, EXTENDED RELEASE ORAL NIGHTLY
Status: DISCONTINUED | OUTPATIENT
Start: 2021-09-09 | End: 2021-09-13 | Stop reason: HOSPADM

## 2021-09-09 RX ORDER — TROSPIUM CHLORIDE 20 MG/1
20 TABLET, FILM COATED ORAL
Status: DISCONTINUED | OUTPATIENT
Start: 2021-09-10 | End: 2021-09-13 | Stop reason: HOSPADM

## 2021-09-09 RX ORDER — POTASSIUM CHLORIDE 20 MEQ/1
40 TABLET, EXTENDED RELEASE ORAL PRN
Status: DISCONTINUED | OUTPATIENT
Start: 2021-09-09 | End: 2021-09-13 | Stop reason: HOSPADM

## 2021-09-09 RX ORDER — SODIUM CHLORIDE 0.9 % (FLUSH) 0.9 %
10 SYRINGE (ML) INJECTION PRN
Status: DISCONTINUED | OUTPATIENT
Start: 2021-09-09 | End: 2021-09-13 | Stop reason: HOSPADM

## 2021-09-09 RX ORDER — POLYETHYLENE GLYCOL 3350 17 G/17G
17 POWDER, FOR SOLUTION ORAL DAILY PRN
Status: DISCONTINUED | OUTPATIENT
Start: 2021-09-09 | End: 2021-09-13 | Stop reason: HOSPADM

## 2021-09-09 RX ORDER — ACETAMINOPHEN 325 MG/1
650 TABLET ORAL EVERY 6 HOURS PRN
Status: DISCONTINUED | OUTPATIENT
Start: 2021-09-09 | End: 2021-09-13 | Stop reason: HOSPADM

## 2021-09-09 RX ORDER — ATORVASTATIN CALCIUM 20 MG/1
20 TABLET, FILM COATED ORAL NIGHTLY
Status: DISCONTINUED | OUTPATIENT
Start: 2021-09-09 | End: 2021-09-13 | Stop reason: HOSPADM

## 2021-09-09 RX ORDER — ONDANSETRON 2 MG/ML
4 INJECTION INTRAMUSCULAR; INTRAVENOUS EVERY 6 HOURS PRN
Status: DISCONTINUED | OUTPATIENT
Start: 2021-09-09 | End: 2021-09-13 | Stop reason: HOSPADM

## 2021-09-09 RX ORDER — POTASSIUM CHLORIDE 7.45 MG/ML
10 INJECTION INTRAVENOUS PRN
Status: DISCONTINUED | OUTPATIENT
Start: 2021-09-09 | End: 2021-09-13 | Stop reason: HOSPADM

## 2021-09-09 RX ORDER — ONDANSETRON 4 MG/1
4 TABLET, ORALLY DISINTEGRATING ORAL EVERY 8 HOURS PRN
Status: DISCONTINUED | OUTPATIENT
Start: 2021-09-09 | End: 2021-09-13 | Stop reason: HOSPADM

## 2021-09-09 RX ORDER — 0.9 % SODIUM CHLORIDE 0.9 %
80 INTRAVENOUS SOLUTION INTRAVENOUS ONCE
Status: COMPLETED | OUTPATIENT
Start: 2021-09-09 | End: 2021-09-09

## 2021-09-09 RX ORDER — SODIUM CHLORIDE 0.9 % (FLUSH) 0.9 %
10 SYRINGE (ML) INJECTION ONCE
Status: COMPLETED | OUTPATIENT
Start: 2021-09-09 | End: 2021-09-09

## 2021-09-09 RX ORDER — MAGNESIUM SULFATE 1 G/100ML
1000 INJECTION INTRAVENOUS PRN
Status: DISCONTINUED | OUTPATIENT
Start: 2021-09-09 | End: 2021-09-13 | Stop reason: HOSPADM

## 2021-09-09 RX ORDER — VENLAFAXINE 75 MG/1
75 TABLET ORAL 3 TIMES DAILY
COMMUNITY

## 2021-09-09 RX ORDER — SODIUM CHLORIDE 9 MG/ML
25 INJECTION, SOLUTION INTRAVENOUS PRN
Status: DISCONTINUED | OUTPATIENT
Start: 2021-09-09 | End: 2021-09-13 | Stop reason: HOSPADM

## 2021-09-09 RX ORDER — LISINOPRIL 10 MG/1
20 TABLET ORAL DAILY
Status: DISCONTINUED | OUTPATIENT
Start: 2021-09-10 | End: 2021-09-13 | Stop reason: HOSPADM

## 2021-09-09 RX ADMIN — SODIUM CHLORIDE, PRESERVATIVE FREE 10 ML: 5 INJECTION INTRAVENOUS at 19:18

## 2021-09-09 RX ADMIN — IOPAMIDOL 75 ML: 755 INJECTION, SOLUTION INTRAVENOUS at 19:18

## 2021-09-09 RX ADMIN — SODIUM CHLORIDE 80 ML: 9 INJECTION, SOLUTION INTRAVENOUS at 19:18

## 2021-09-09 ASSESSMENT — ENCOUNTER SYMPTOMS
CHEST TIGHTNESS: 0
ABDOMINAL PAIN: 0
SHORTNESS OF BREATH: 0
BACK PAIN: 1
ABDOMINAL DISTENTION: 0
FACIAL SWELLING: 0
EYE DISCHARGE: 0
EYE PAIN: 0

## 2021-09-09 ASSESSMENT — PAIN SCALES - GENERAL: PAINLEVEL_OUTOF10: 8

## 2021-09-09 NOTE — ED NOTES
Bed: 09  Expected date:   Expected time:   Means of arrival:   Comments:  nahun Wilson, KARYNA  09/09/21 8277

## 2021-09-09 NOTE — ED PROVIDER NOTES
EMERGENCY DEPARTMENT ENCOUNTER    Pt Name: Dorota Cespedes  MRN: [id number]  [place] 1934  Date of evaluation: 9/9/21  CHIEF COMPLAINT       Chief Complaint   Patient presents with    Ankle Pain     Rt ankle pain; no deformity noted     HISTORY OF PRESENT ILLNESS   HPI   The patient is a 80-year-old female who presented to the emergency department by EMS from home secondary to fall. Patient states she fell in her kitchen yesterday landing on her right ankle she possibly hit her back but she denied hitting her head. Patient takes Coumadin secondary to atrial fibrillation. Patient also stated she is had pain in her low back she rates 8 out of 10 on the pain scale. Patient denied chest pain, shortness of breath, nausea, vomiting, fevers or chills. REVIEW OF SYSTEMS     Review of Systems   Constitutional: Negative for chills, diaphoresis and fever. HENT: Negative for congestion, ear pain and facial swelling. Eyes: Negative for pain, discharge and visual disturbance. Respiratory: Negative for chest tightness and shortness of breath. Cardiovascular: Negative for chest pain and palpitations. Gastrointestinal: Negative for abdominal distention and abdominal pain. Genitourinary: Negative for difficulty urinating and flank pain. Musculoskeletal: Positive for back pain. Left ankle pain   Skin: Negative for wound. Neurological: Negative for dizziness, light-headedness and headaches.      PASTMEDICAL HISTORY     Past Medical History:   Diagnosis Date    Atrial fibrillation ([address]     CAD (coronary artery disease)     Hyperlipidemia     Hypertension     S/P cardiac cath      SURGICAL HISTORY       Past Surgical History:   Procedure Laterality Date    ABLATION OF DYSRHYTHMIC FOCUS      COLONOSCOPY      PACEMAKER PLACEMENT       CURRENT MEDICATIONS       Previous Medications    ACETAMINOPHEN (TYLENOL) 325 MG TABLET    Take 650 mg by mouth every 6 hours as needed for Pain    APOAEQUORIN (PREVAGEN PO)    Take 1 capsule by mouth daily    ATORVASTATIN (LIPITOR) 20 MG TABLET    Take 1 tablet by mouth nightly    DOXYCYCLINE (VIBRAMYCIN) 50 MG CAPSULE    Take 50 mg by mouth daily    DRONEDARONE HCL (MULTAQ) 400 MG TABS    Take 1 tablet by mouth 2 times daily (with meals)    HYDROCHLOROTHIAZIDE (HYDRODIURIL) 25 MG TABLET    Take 25 mg by mouth daily    LISINOPRIL (PRINIVIL;ZESTRIL) 40 MG TABLET    Take 0.5 tablets by mouth daily    VERAPAMIL (CALAN SR) 240 MG EXTENDED RELEASE TABLET    Take 1 tablet by mouth nightly    WARFARIN (COUMADIN) 5 MG TABLET    Take 5 mg by mouth daily      ALLERGIES     has No Known Allergies. FAMILY HISTORY     She indicated that her mother is . She indicated that her father is . SOCIAL HISTORY       Social History     Tobacco Use    Smoking status: Former Smoker     Packs/day: 1.00     Years: 50.00     Pack years: 50.00     Types: Cigarettes    Smokeless tobacco: Never Used    Tobacco comment: quit 50 years ago   Vaping Use    Vaping Use: Never used   Substance Use Topics    Alcohol use: No    Drug use: Never     PHYSICAL EXAM     INITIAL VITALS: BP (!) 191/89   Pulse 105   Temp 98.2 °F (36.8 °C) (Oral)   Resp 18   Ht 5' 6\" (1.676 m)   Wt 174 lb (78.9 kg)   SpO2 96%   BMI 28.08 kg/m²    Physical Exam  Vitals and nursing note reviewed. Constitutional:       General: She is not in acute distress. Appearance: She is well-developed. She is not diaphoretic. HENT:      Head: Normocephalic and atraumatic. Eyes:      Pupils: Pupils are equal, round, and reactive to light. Cardiovascular:      Rate and Rhythm: Normal rate and regular rhythm. Pulmonary:      Effort: Pulmonary effort is normal.      Breath sounds: Normal breath sounds. Abdominal:      General: Bowel sounds are normal.      Palpations: Abdomen is soft. Musculoskeletal:         General: Normal range of motion. Cervical back: Normal range of motion and neck supple. Comments: Right ankle: Tender palpation noted swelling of the lateral malleoli, distal pulses palpable    Skin:     General: Skin is warm. Capillary Refill: Capillary refill takes less than 2 seconds. Neurological:      Mental Status: She is alert and oriented to person, place, and time. MEDICAL DECISION MAKING:   Patient is a 79-year-old female who presented to the emergency department secondary to fall unwitnessed, initially patient states she had pain in her left ankle orders for imaging given the ecchymoses concern for other injury. The following imaging obtained: CT head, neck, lumbar and thoracic spine as well as CT chest without contrast and a CT Abdo pelvis with contrast given the ecchymoses and patient's history of Coumadin use. Patient reevaluated stated it was her right ankle therefore orders placed for x-rays of the right ankle. Imaging pending at this time, patient signed out to Dr. Catarino Richards pending imaging reevaluation. Please see his note for final ED disposition         All patient's question's and concerns were answered prior to disposition and patient and/or family expressed understanding and agreement of treatment plan. CRITICAL CARE:              NIH STROKE SCALE:            PROCEDURES:    Procedures    DIAGNOSTIC RESULTS   EKG:All EKG's are interpreted by the Emergency Department Physician who either signs or Co-signs this chart in the absence of a cardiologist.        RADIOLOGY:All plain film, CT, MRI, and formal ultrasound images (except ED bedside ultrasound) are read by the radiologist, see reports below, unless otherwisenoted in MDM or here. XR CHEST PORTABLE   Final Result   No acute cardiopulmonary disease         XR ANKLE LEFT (MIN 3 VIEWS)   Final Result   Soft tissue swelling is identified at the ankle, though no fracture or   dislocation is identified.          CT Head WO Contrast    (Results Pending)   CT Cervical Spine WO Contrast    (Results Pending)   CT José Cole MD  32/95/75 7098

## 2021-09-10 ENCOUNTER — APPOINTMENT (OUTPATIENT)
Dept: GENERAL RADIOLOGY | Age: 86
End: 2021-09-10
Payer: MEDICARE

## 2021-09-10 ENCOUNTER — APPOINTMENT (OUTPATIENT)
Dept: CT IMAGING | Age: 86
End: 2021-09-10
Payer: MEDICARE

## 2021-09-10 PROBLEM — R29.6 REPEATED FALLS: Status: ACTIVE | Noted: 2021-09-10

## 2021-09-10 PROBLEM — M48.061 SPINAL STENOSIS OF LUMBAR REGION: Status: ACTIVE | Noted: 2021-09-10

## 2021-09-10 LAB
ANION GAP SERPL CALCULATED.3IONS-SCNC: 8 MMOL/L (ref 9–17)
BUN BLDV-MCNC: 9 MG/DL (ref 8–23)
BUN/CREAT BLD: 17 (ref 9–20)
CALCIUM SERPL-MCNC: 9.1 MG/DL (ref 8.6–10.4)
CHLORIDE BLD-SCNC: 104 MMOL/L (ref 98–107)
CO2: 28 MMOL/L (ref 20–31)
CREAT SERPL-MCNC: 0.53 MG/DL (ref 0.5–0.9)
GFR AFRICAN AMERICAN: >60 ML/MIN
GFR NON-AFRICAN AMERICAN: >60 ML/MIN
GFR SERPL CREATININE-BSD FRML MDRD: ABNORMAL ML/MIN/{1.73_M2}
GFR SERPL CREATININE-BSD FRML MDRD: ABNORMAL ML/MIN/{1.73_M2}
GLUCOSE BLD-MCNC: 121 MG/DL (ref 70–99)
HCT VFR BLD CALC: 36.9 % (ref 36.3–47.1)
HEMOGLOBIN: 11.9 G/DL (ref 11.9–15.1)
INR BLD: 2.5
MAGNESIUM: 1.7 MG/DL (ref 1.6–2.6)
MCH RBC QN AUTO: 31.4 PG (ref 25.2–33.5)
MCHC RBC AUTO-ENTMCNC: 32.2 G/DL (ref 28.4–34.8)
MCV RBC AUTO: 97.4 FL (ref 82.6–102.9)
NRBC AUTOMATED: 0 PER 100 WBC
PDW BLD-RTO: 12.5 % (ref 11.8–14.4)
PLATELET # BLD: 142 K/UL (ref 138–453)
PMV BLD AUTO: 11.7 FL (ref 8.1–13.5)
POTASSIUM SERPL-SCNC: 3.1 MMOL/L (ref 3.7–5.3)
PROTHROMBIN TIME: 26.4 SEC (ref 11.5–14.2)
RBC # BLD: 3.79 M/UL (ref 3.95–5.11)
SODIUM BLD-SCNC: 140 MMOL/L (ref 135–144)
WBC # BLD: 8.4 K/UL (ref 3.5–11.3)

## 2021-09-10 PROCEDURE — 6370000000 HC RX 637 (ALT 250 FOR IP): Performed by: NURSE PRACTITIONER

## 2021-09-10 PROCEDURE — 73610 X-RAY EXAM OF ANKLE: CPT

## 2021-09-10 PROCEDURE — 2580000003 HC RX 258: Performed by: NURSE PRACTITIONER

## 2021-09-10 PROCEDURE — 97530 THERAPEUTIC ACTIVITIES: CPT

## 2021-09-10 PROCEDURE — G0378 HOSPITAL OBSERVATION PER HR: HCPCS

## 2021-09-10 PROCEDURE — 36415 COLL VENOUS BLD VENIPUNCTURE: CPT

## 2021-09-10 PROCEDURE — 83735 ASSAY OF MAGNESIUM: CPT

## 2021-09-10 PROCEDURE — 80048 BASIC METABOLIC PNL TOTAL CA: CPT

## 2021-09-10 PROCEDURE — 99232 SBSQ HOSP IP/OBS MODERATE 35: CPT | Performed by: FAMILY MEDICINE

## 2021-09-10 PROCEDURE — 85027 COMPLETE CBC AUTOMATED: CPT

## 2021-09-10 PROCEDURE — 97163 PT EVAL HIGH COMPLEX 45 MIN: CPT

## 2021-09-10 PROCEDURE — 97112 NEUROMUSCULAR REEDUCATION: CPT

## 2021-09-10 PROCEDURE — 85610 PROTHROMBIN TIME: CPT

## 2021-09-10 PROCEDURE — 6360000002 HC RX W HCPCS: Performed by: NURSE PRACTITIONER

## 2021-09-10 PROCEDURE — 70450 CT HEAD/BRAIN W/O DYE: CPT

## 2021-09-10 PROCEDURE — 97535 SELF CARE MNGMENT TRAINING: CPT

## 2021-09-10 PROCEDURE — 97167 OT EVAL HIGH COMPLEX 60 MIN: CPT

## 2021-09-10 PROCEDURE — 96365 THER/PROPH/DIAG IV INF INIT: CPT

## 2021-09-10 PROCEDURE — 6370000000 HC RX 637 (ALT 250 FOR IP): Performed by: FAMILY MEDICINE

## 2021-09-10 RX ORDER — CEPHALEXIN 500 MG/1
500 CAPSULE ORAL EVERY 12 HOURS SCHEDULED
Qty: 10 CAPSULE | Refills: 0 | DISCHARGE
Start: 2021-09-10 | End: 2021-09-15

## 2021-09-10 RX ORDER — CEFTRIAXONE 1 G/1
INJECTION, POWDER, FOR SOLUTION INTRAMUSCULAR; INTRAVENOUS
Status: DISPENSED
Start: 2021-09-10 | End: 2021-09-10

## 2021-09-10 RX ORDER — LISINOPRIL 20 MG/1
20 TABLET ORAL DAILY
DISCHARGE
Start: 2021-09-10

## 2021-09-10 RX ORDER — CEPHALEXIN 500 MG/1
500 CAPSULE ORAL EVERY 12 HOURS SCHEDULED
Status: DISCONTINUED | OUTPATIENT
Start: 2021-09-10 | End: 2021-09-13 | Stop reason: HOSPADM

## 2021-09-10 RX ORDER — VENLAFAXINE 75 MG/1
75 TABLET ORAL 3 TIMES DAILY
Status: DISCONTINUED | OUTPATIENT
Start: 2021-09-10 | End: 2021-09-13 | Stop reason: HOSPADM

## 2021-09-10 RX ORDER — CEPHALEXIN 500 MG/1
500 CAPSULE ORAL EVERY 12 HOURS SCHEDULED
Status: DISCONTINUED | OUTPATIENT
Start: 2021-09-10 | End: 2021-09-10

## 2021-09-10 RX ADMIN — ACETAMINOPHEN 650 MG: 325 TABLET ORAL at 03:51

## 2021-09-10 RX ADMIN — TROSPIUM CHLORIDE 20 MG: 20 TABLET, FILM COATED ORAL at 09:01

## 2021-09-10 RX ADMIN — VERAPAMIL HYDROCHLORIDE 240 MG: 240 TABLET, FILM COATED, EXTENDED RELEASE ORAL at 21:28

## 2021-09-10 RX ADMIN — POTASSIUM CHLORIDE 40 MEQ: 20 TABLET, EXTENDED RELEASE ORAL at 09:01

## 2021-09-10 RX ADMIN — LISINOPRIL 20 MG: 10 TABLET ORAL at 11:07

## 2021-09-10 RX ADMIN — CEPHALEXIN 500 MG: 500 CAPSULE ORAL at 21:28

## 2021-09-10 RX ADMIN — ATORVASTATIN CALCIUM 20 MG: 20 TABLET, FILM COATED ORAL at 21:28

## 2021-09-10 RX ADMIN — TROSPIUM CHLORIDE 20 MG: 20 TABLET, FILM COATED ORAL at 15:34

## 2021-09-10 RX ADMIN — CEFTRIAXONE SODIUM 1000 MG: 1 INJECTION, POWDER, FOR SOLUTION INTRAMUSCULAR; INTRAVENOUS at 02:55

## 2021-09-10 RX ADMIN — SODIUM CHLORIDE, PRESERVATIVE FREE 10 ML: 5 INJECTION INTRAVENOUS at 08:38

## 2021-09-10 RX ADMIN — RIVAROXABAN 20 MG: 20 TABLET, FILM COATED ORAL at 17:37

## 2021-09-10 RX ADMIN — VENLAFAXINE HYDROCHLORIDE 75 MG: 75 TABLET ORAL at 15:34

## 2021-09-10 RX ADMIN — VENLAFAXINE HYDROCHLORIDE 75 MG: 75 TABLET ORAL at 21:28

## 2021-09-10 ASSESSMENT — PAIN SCALES - GENERAL
PAINLEVEL_OUTOF10: 0
PAINLEVEL_OUTOF10: 5
PAINLEVEL_OUTOF10: 9
PAINLEVEL_OUTOF10: 0

## 2021-09-10 ASSESSMENT — ENCOUNTER SYMPTOMS
BLOOD IN STOOL: 0
ABDOMINAL PAIN: 0
NAUSEA: 0
CONSTIPATION: 0
COUGH: 0
WHEEZING: 0
VOMITING: 0
COLOR CHANGE: 0
SHORTNESS OF BREATH: 0
RHINORRHEA: 0
CHEST TIGHTNESS: 0
DIARRHEA: 0

## 2021-09-10 ASSESSMENT — PAIN DESCRIPTION - LOCATION: LOCATION: ANKLE

## 2021-09-10 ASSESSMENT — PAIN DESCRIPTION - ORIENTATION: ORIENTATION: RIGHT

## 2021-09-10 NOTE — PROGRESS NOTES
[facility]  Office: [address], DO, Killian Gear, DO, Mitali Xie, DO, Gaurav Elida Blood, DO, Jose Clark MD, Maira Bueno MD, Ann Peña MD, Oliver Whitfield MD, Princess Parkinson MD, Sue Fry MD, Selin Crow MD, Gayathri Astudillo, DO, Jorge Zavaleta DO, Aga Young MD,  Rachana Webb, DO, Twan Campos MD, Tessy Trimble MD, Irais Kimble MD, Ara Glaser MD, , Kyra Lee MD, Kasey Goldberg MD, Suzy Kasper MD, Zia Newberry [facility], CNP, Myrna Ariza, CNP, Nadya Mask, CNS, Marques Kingsley, CNP, Cassie Brown, CNP, Ngoc Lucas, CNP, Santana Silva, CNP, Olvin Fry, CNP, Lendel Nissen, PA-C, Suman Choudhury, [facility], Patrick Altman, CNP, Rhonda Patricio, CNP, Lyndsey Valadez, CNP, Moises Johnston, CNP, Cara Andrews, CNP, Lupillo Mendez, CNP, Willow Knapp, CNP, Julieth [facility]      Daily Progress Note     Admit Date: 9/9/2021  Bed/Room No.  2006/2006-02  Admitting Physician : Jorge Zavaleta DO  Code Status :Full Code  Hospital Day:  LOS: 1 day   Chief Complaint:     Chief Complaint   Patient presents with    Ankle Pain     Rt ankle pain; no deformity noted     Principal Problem:    Declining functional status  Active Problems:    Chronic atrial fibrillation [facility])    Dyslipidemia    Essential hypertension    Fall  Resolved Problems:    Acute cystitis without hematuria    Subjective : Interval History/Significant events :  09/10/21    Patient remains alert, oriented. She has no acute complaints for me. Patient denies any dysuria, hematuria, abdominal pain, nausea, vomiting, confusion. She is complaining of right ankle pain. Vitals - Stable afebrile  Labs -hypokalemia 3.1,    Nursing notes , Consults notes reviewed. Overnight events and updates discussed with Nursing staff .    Background History:         Estefany Valverde is 80 y.o. female  Who was admitted to the hospital on 9/9/2021 for treatment of Declining functional status. Presented to the hospital with fall at home 2 days ago. Patient was complaining of hip pain and ankle pain on the right side. She was complaining of overall generalized weakness. Evaluation emergency room showed normal white count, normal electrolytes, kidney function. UA was positive for trace ketones, many bacteria, negative nitrite, leukoesterase. Chest x-ray was negative for acute cardiopulmonary disease, x-ray left ankle showed swelling without any fracture or dislocation, CT head was negative for acute abnormality and showed mild central and cortical cerebral atrophy and deep white matter changes, x-ray cervical spine showed multilevel cervical spondylosis grade 1 spondylolisthesis of C4 with respect to C3, CT thoracic and lumbar spine showed multilevel spondylosis with moderate central canal stenosis at L3-L4. CT abdomen pelvis showed small foci of gas within bladder and minimal adjacent stranding findings concerning for instrumentation versus cystitis, moderate hiatal hernia. Patient was admitted for acute cystitis and further evaluation of her gait due to her repeated falls at home. PMH:  Past Medical History:   Diagnosis Date    Atrial fibrillation ([address]     CAD (coronary artery disease)     Hyperlipidemia     Hypertension     S/P cardiac cath       Allergies: No Known Allergies   Medications :  cefTRIAXone, , ,   atorvastatin, 20 mg, Oral, Nightly  lisinopril, 20 mg, Oral, Daily  trospium, 20 mg, Oral, BID AC  rivaroxaban, 20 mg, Oral, Daily  verapamil, 240 mg, Oral, Nightly  hydroCHLOROthiazide, 25 mg, Oral, Daily  sodium chloride flush, 5-40 mL, IntraVENous, 2 times per day        Review of Systems   Review of Systems   Constitutional: Negative for appetite change, fatigue, fever and unexpected weight change. HENT: Negative for congestion, rhinorrhea and sneezing. Eyes: Negative for visual disturbance.    Respiratory: Negative for cough, chest tightness, shortness of breath and wheezing. Cardiovascular: Negative for chest pain and palpitations. Gastrointestinal: Negative for abdominal pain, blood in stool, constipation, diarrhea, nausea and vomiting. Genitourinary: Negative for dysuria, enuresis, frequency and hematuria. Musculoskeletal: Negative for arthralgias and myalgias. Right ankle pain   Skin: Negative for rash. Neurological: Negative for dizziness, weakness, light-headedness and headaches. Hematological: Does not bruise/bleed easily. Psychiatric/Behavioral: Negative for dysphoric mood and sleep disturbance. Objective :      Current Vitals : Temp: 98.3 °F (36.8 °C),  Pulse: 65, Resp: 16, BP: (!) 107/51, SpO2: 96 %  Last 24 Hrs Vitals   Patient Vitals for the past 24 hrs:   BP Temp Temp src Pulse Resp SpO2 Height Weight   09/10/21 0615 (!) 107/51 98.3 °F (36.8 °C) Oral 65 16 96 % -- --   09/10/21 0600 (!) 101/48 -- -- 85 19 -- -- --   09/10/21 0500 (!) 99/43 -- -- 82 18 -- -- --   09/10/21 0400 116/60 -- -- 81 16 -- -- --   09/10/21 0300 (!) 95/45 -- -- 80 15 96 % -- --   09/10/21 0209 -- -- -- 80 20 95 % -- --   09/09/21 1734 (!) 191/89 -- -- -- -- -- -- --   09/09/21 1732 (!) 191/89 98.2 °F (36.8 °C) Oral 105 18 96 % 5' 6\" (1.676 m) 174 lb (78.9 kg)     Intake / output   No intake/output data recorded. Physical Exam:  Physical Exam  Vitals and nursing note reviewed. Constitutional:       General: She is not in acute distress. Appearance: She is not diaphoretic. HENT:      Head: Normocephalic and atraumatic. Nose:      Right Sinus: No maxillary sinus tenderness or frontal sinus tenderness. Left Sinus: No maxillary sinus tenderness or frontal sinus tenderness. Mouth/Throat:      Pharynx: No oropharyngeal exudate. Eyes:      General: No scleral icterus. Conjunctiva/sclera: Conjunctivae normal.      Pupils: Pupils are equal, round, and reactive to light. Neck:      Thyroid: No thyromegaly.       Vascular: 09/09/2021 MANY (A) None Final     Nitrite, Urine   Date Value Ref Range Status   09/09/2021 NEGATIVE NEGATIVE Final     WBC, UA   Date Value Ref Range Status   09/09/2021 0 TO 2 0 - 5 /HPF Final     Leukocyte Esterase, Urine   Date Value Ref Range Status   09/09/2021 NEGATIVE NEGATIVE Final       Imaging / Clinical Data :-   XR ANKLE LEFT (MIN 3 VIEWS)    Result Date: 9/9/2021  Soft tissue swelling is identified at the ankle, though no fracture or dislocation is identified. CT Head WO Contrast    Result Date: 9/9/2021  Mild central and cortical cerebral atrophy. Mild chronic deep white matter ischemic changes No acute intracranial abnormalities are noted. CT CHEST WO CONTRAST    Result Date: 9/9/2021  1. Solid and subsolid noncalcified pulmonary nodules measuring up to 5 mm. These could be infectious or inflammatory, but short interval follow-up is recommended. 2. Cardiomegaly. Coronary artery atherosclerosis. RECOMMENDATIONS: Multiple pulmonary nodules. Most severe: 5 mm left ground-glass pulmonary nodule within the upper lobe. Recommend a non-contrast Chest CT at 3-6 months. If stable, consider follow-up non-contrast Chest CTs at 2 and 4 years. These guidelines do not apply to immunocompromised patients and patients with cancer. Follow up in patients with significant comorbidities as clinically warranted. For lung cancer screening, adhere to Lung-RADS guidelines. Reference: Radiology. 2017; 284(1):228-43. CT Cervical Spine WO Contrast    Result Date: 9/9/2021  No acute bony abnormalities are noted. Multilevel lumbar spondylosis, facet arthropathy and degenerative disc disease as described above. Moderate central canal and lateral recess spinal stenosis at L3-4. Right foraminal stenosis at L2-3 and L3-4. RECOMMENDATIONS: Further evaluation may be obtained with MR imaging if clinically indicated.  CT CERVICAL SPINE FINDINGS: The cervical spine demonstrates decreasedmineralization with  cervical lordosis. There is no evidence of acute fracture . Old fracture of the spinous process of C6. Grade 1 spondylolisthesis of C 4 with respect to C3. Alicia Money There is loss of disc height with eburnation of the vertebral endplates at the N8-2, B9-5, C5-6, C6-7 and C7-T1 levels. There are anterior and posterior marginal osteophytes at multiple levels. The central canal is grossly patent. There is bilateral facet hypertrophy at multiple levels throughout the cervical spine. The pedicles and posterior elements are otherwise intact. The prevertebral and paravertebral soft tissues are unremarkable. The atlanto-dens interval and dens are intact. The visualized lung apices are clear. Vascular calcifications are seen compatible with atherosclerotic disease. IMPRESSION: Multilevel cervical spondylosis and degenerative disc disease. Grade 1 spondylolisthesis of C 4 with respect to C3 which appears chronic RECOMMENDATIONS: Further evaluation should be obtained with MR imaging if clinically indicated. CT THORACIC SPINE WO CONTRAST    Result Date: 9/9/2021  No acute bony abnormalities are noted. Multilevel lumbar spondylosis, facet arthropathy and degenerative disc disease as described above. Moderate central canal and lateral recess spinal stenosis at L3-4. Right foraminal stenosis at L2-3 and L3-4. RECOMMENDATIONS: Further evaluation may be obtained with MR imaging if clinically indicated. CT CERVICAL SPINE FINDINGS: The cervical spine demonstrates decreasedmineralization with  cervical lordosis. There is no evidence of acute fracture . Old fracture of the spinous process of C6. Grade 1 spondylolisthesis of C 4 with respect to C3. Alicia Money There is loss of disc height with eburnation of the vertebral endplates at the F1-3, G2-8, C5-6, C6-7 and C7-T1 levels. There are anterior and posterior marginal osteophytes at multiple levels. The central canal is grossly patent.   There is bilateral facet hypertrophy at multiple levels throughout the cervical spine. The pedicles and posterior elements are otherwise intact. The prevertebral and paravertebral soft tissues are unremarkable. The atlanto-dens interval and dens are intact. The visualized lung apices are clear. Vascular calcifications are seen compatible with atherosclerotic disease. IMPRESSION: Multilevel cervical spondylosis and degenerative disc disease. Grade 1 spondylolisthesis of C 4 with respect to C3 which appears chronic RECOMMENDATIONS: Further evaluation should be obtained with MR imaging if clinically indicated. CT LUMBAR SPINE WO CONTRAST    Result Date: 9/9/2021  No acute bony abnormalities are noted. Multilevel lumbar spondylosis, facet arthropathy and degenerative disc disease as described above. Moderate central canal and lateral recess spinal stenosis at L3-4. Right foraminal stenosis at L2-3 and L3-4. RECOMMENDATIONS: Further evaluation may be obtained with MR imaging if clinically indicated. CT CERVICAL SPINE FINDINGS: The cervical spine demonstrates decreasedmineralization with  cervical lordosis. There is no evidence of acute fracture . Old fracture of the spinous process of C6. Grade 1 spondylolisthesis of C 4 with respect to C3. Lubna Dye There is loss of disc height with eburnation of the vertebral endplates at the Q1-6, H5-5, C5-6, C6-7 and C7-T1 levels. There are anterior and posterior marginal osteophytes at multiple levels. The central canal is grossly patent. There is bilateral facet hypertrophy at multiple levels throughout the cervical spine. The pedicles and posterior elements are otherwise intact. The prevertebral and paravertebral soft tissues are unremarkable. The atlanto-dens interval and dens are intact. The visualized lung apices are clear. Vascular calcifications are seen compatible with atherosclerotic disease. IMPRESSION: Multilevel cervical spondylosis and degenerative disc disease.  Grade 1 spondylolisthesis of C 4 with respect to C3 which appears chronic RECOMMENDATIONS: Further evaluation should be obtained with MR imaging if clinically indicated. CT ABDOMEN PELVIS W IV CONTRAST Additional Contrast? None    Result Date: 9/9/2021  There is a small foci of gas within the bladder and minimal adjacent stranding. Findings concerning for cystitis in the absence of recent instrumentation. Clinical correlation recommended. Mild interstitial thickening in the visualized lung bases may be related to mild pulmonary edema. Clinical correlation recommended. Moderate hiatal hernia. XR CHEST PORTABLE    Result Date: 9/9/2021  No acute cardiopulmonary disease        Clinical Course : stable  Assessment and Plan  :        1. Acute cystitis-oral Keflex -received Rocephin in emergency room. 2. Repeated falls due to debility secondary to age and lumbar degenerative disease with moderate spinal stenosis -PT OT no acute surgical intervention required. Follow outpatient with orthopedic surgery. 3. Chronic atrial fibrillation -on Xarelto. 4. essential hypertension -stable, discontinue hydrochlorothiazide-continue lisinopril 20 mg daily      Continue to monitor vitals , Intake / output ,  Cell count , HGB , Kidney function, oxygenation  as indicated . Plan and updates discussed with patient ,  answers  explained to satisfaction.    Plan discussed with Staff Bibiana Kessler RN     (Please note that portions of this note were completed with a voice recognition program. Efforts were made to edit the dictations but occasionally words are mis-transcribed.)      Radha Hutchinson MD  9/10/2021

## 2021-09-10 NOTE — PROGRESS NOTES
Physician Progress Note      PATIENT:               Jeff Jenkins  CSN #:                  [id number]  :                       1934  ADMIT DATE:       2021 5:32 PM  DISCH DATE:  RESPONDING  PROVIDER #:        Bambi Jim MD          QUERY TEXT:    Pt admitted with declining functional status, frequent falling with   documentation of having laid in urine and stool all day. If possible, please   document in the progress notes and discharge summary if you are evaluating and   / or treating any of the following: The medical record reflects the following:  Risk Factors: advanced age of 80, caring for  with dementia;  CT L   spine:  Multilevel lumbar spondylosis, facet arthropathy and degenerative disc   disease. Moderate central canal and lateral recess spinal stenosis at   L3-4. Right foraminal stenosis at L2-3 and L3-4. Clinical Indicators: frequent falls and declining functional status in setting   of above risk factors  Treatment: admission with diagnostic testing, PT/OT and SW consults  Options provided:  -- Age Related Physical Debility  -- Frailty  -- Weakness and frequent falls due to lumbar spondylosis and degenerative disc   disease with spinal stenosis  -- Other - I will add my own diagnosis  -- Disagree - Not applicable / Not valid  -- Disagree - Clinically unable to determine / Unknown  -- Refer to Clinical Documentation Reviewer    PROVIDER RESPONSE TEXT:    This patient has age related physical debility.     Query created by: Aileen Ordaz on 9/10/2021 10:11 AM      Electronically signed by:  Bambi Jim MD 9/10/2021 5:30 PM

## 2021-09-10 NOTE — H&P
[facility]  Office: [address], DO, Oneida Gold, DO, Thiago Neff, DO, Tomasa Red Blood, DO, Leonidas Sanabria MD, Carolin Rand MD, Itzel Ramirez MD, Lucía Harper MD, Ora English MD, Aniya Kern MD, Asia Mead MD, Scott Healy, DO, Kota Estrada, DO, Melissa Ribeiro MD,  Marcos Jennings DO, Ld Mendez MD, Fiorella Murcia MD, Edward Brizuela MD, Black Mcintosh MD, , Grisel Sebastian MD, Sammy Torrez MD, Paco Dillon MD, Jefffrank Nava, [facility], [address], [facility], Prashanth Reddy, CNP, Blanca William, CNS, Cruz Dobson, CNP, Claudia Price, CNP, Guy Barger, CNP, Micah Chavez, CNP, Mir Barahona, CNP, Shayan Medina PA-C, Vianca Amaya, [facility], Kwadwo Chavarria, CNP, Oscar Barney, CNP, Janina Tang, CNP, Freedom Hubbard, CNP, Kathryn Goetz, CNP, Rachelle Nichols, CNP, Kaylan Hoyt, CNP, Loida Wray, CNP         [facility]    HISTORY AND PHYSICAL EXAMINATION            Date:   9/9/2021  Patient name:  Saritha Mandujano  Date of admission:  9/9/2021  5:32 PM  MRN:   [id number]  Account:  [de-identified]  YOB: 1934  PCP:    Ha Low MD  Room:   [place] [address]  Code Status:    Prior    Chief Complaint:     Chief Complaint   Patient presents with    Ankle Pain     Rt ankle pain; no deformity noted     History Obtained From:     Patient and electronic medical record. History of Present Illness:     Saritha Mandujano is a 80 y.o. Non- / non  female who presents with Ankle Pain (Rt ankle pain; no deformity noted)   and is admitted to the hospital for the management of Declining functional status. The patient presents to the hospital with complaint of fall at home. She states she fell yesterday and inured her right hip and ankle. She states she cannot bare weight on her right ankle. She denies laying on the floor, and states her  had her crawl to a chair and she managed to get into it.  However, she could not get up because she could not put weight in her ankle. She states she has fell 2-3 times in the last week. No dizziness or lightheadedness. She denies dysuria. No additional symptomology or further modifying factors. She has past medical history that includes atrial fibrillation (xarelto), hypertension and dyslipidemia. She has an implanted pacemaker. She follows outpatient with Dr. Azam Severino with cardiology. UA negative for nitrates, no leukocyte Estrace, no WBC, positive for many bacteria. Past Medical History:     Past Medical History:   Diagnosis Date    Atrial fibrillation ([address]     CAD (coronary artery disease)     Hyperlipidemia     Hypertension     S/P cardiac cath         Past Surgical History:     Past Surgical History:   Procedure Laterality Date    ABLATION OF DYSRHYTHMIC FOCUS      COLONOSCOPY      PACEMAKER PLACEMENT          Medications Prior to Admission:     Prior to Admission medications    Medication Sig Start Date End Date Taking?  Authorizing Provider   mirabegron (MYRBETRIQ) 50 MG TB24 Take 50 mg by mouth daily   Yes Historical Provider, MD   venlafaxine (EFFEXOR) 75 MG tablet Take 75 mg by mouth 3 times daily   Yes Historical Provider, MD   rivaroxaban (XARELTO) 20 MG TABS tablet Take 20 mg by mouth   Yes Historical Provider, MD   atorvastatin (LIPITOR) 20 MG tablet Take 1 tablet by mouth nightly 12/6/19  Yes Padmini Fuelling P Blood, DO   lisinopril (PRINIVIL;ZESTRIL) 40 MG tablet Take 0.5 tablets by mouth daily 12/6/19  Yes Padmini Fuelling P Blood, DO   verapamil (CALAN SR) 240 MG extended release tablet Take 1 tablet by mouth nightly 12/7/19  Yes Padmini Fuelling P Blood, DO   hydrochlorothiazide (HYDRODIURIL) 25 MG tablet Take 25 mg by mouth daily   Yes Historical Provider, MD   doxycycline (VIBRAMYCIN) 50 MG capsule Take 50 mg by mouth daily   Yes Historical Provider, MD   dronedarone hcl (MULTAQ) 400 MG TABS Take 1 tablet by mouth 2 times daily (with meals) 12/6/19   Padmini Fuelling P Blood, DO Apoaequorin (PREVAGEN PO) Take 1 capsule by mouth daily    Historical Provider, MD   acetaminophen (TYLENOL) 325 MG tablet Take 650 mg by mouth every 6 hours as needed for Pain    Historical Provider, MD        Allergies:     Patient has no known allergies. Social History:     Tobacco:    reports that she has quit smoking. Her smoking use included cigarettes. She has a 50.00 pack-year smoking history. She has never used smokeless tobacco.  Alcohol:      reports no history of alcohol use. Drug Use:  reports no history of drug use. Family History:     Family History   Problem Relation Age of Onset    No Known Problems Mother     Diabetes type 2  Father        Review of Systems:     Positive and Negative as described in HPI. Review of Systems   Constitutional: Negative for chills, diaphoresis and fever. HENT: Negative for congestion. Eyes: Negative for visual disturbance. Respiratory: Negative for cough, chest tightness, shortness of breath and wheezing. Cardiovascular: Negative for chest pain, palpitations and leg swelling. Gastrointestinal: Negative for abdominal pain, blood in stool, constipation, diarrhea, nausea and vomiting. Endocrine: Negative for cold intolerance and heat intolerance. Genitourinary: Negative for difficulty urinating, dysuria, frequency and urgency. Musculoskeletal: Positive for arthralgias and gait problem. Skin: Negative for color change and rash. Neurological: Positive for weakness. Negative for dizziness, light-headedness, numbness and headaches. Hematological: Does not bruise/bleed easily. Psychiatric/Behavioral: The patient is not nervous/anxious. All other systems reviewed and are negative.       Physical Exam:   BP (!) 191/89   Pulse 105   Temp 98.2 °F (36.8 °C) (Oral)   Resp 18   Ht 5' 6\" (1.676 m)   Wt 174 lb (78.9 kg)   SpO2 96%   BMI 28.08 kg/m²   Temp (24hrs), Av.2 °F (36.8 °C), Min:98.2 °F (36.8 °C), Max:98.2 °F (36.8 °C)    No results for input(s): POCGLU in the last 72 hours. No intake or output data in the 24 hours ending 09/09/21 7545    Physical Exam  Vitals and nursing note reviewed. Constitutional:       Appearance: She is not diaphoretic. HENT:      Head: Normocephalic and atraumatic. Right Ear: Hearing normal.      Left Ear: Hearing normal.      Nose: Nose normal. No rhinorrhea. Eyes:      General: Lids are normal.      Extraocular Movements:      Right eye: Normal extraocular motion. Left eye: Normal extraocular motion. Conjunctiva/sclera: Conjunctivae normal.      Right eye: Right conjunctiva is not injected. Left eye: Left conjunctiva is not injected. Pupils: Pupils are equal, round, and reactive to light. Pupils are equal.      Right eye: Pupil is reactive. Left eye: Pupil is reactive. Neck:      Thyroid: No thyromegaly. Trachea: Trachea normal. No tracheal deviation. Cardiovascular:      Rate and Rhythm: Normal rate and regular rhythm. Pulses: Normal pulses. Heart sounds: Normal heart sounds. Comments: Hx of afib, currently NSR. Implanted pacemaker. Pulmonary:      Effort: Pulmonary effort is normal.      Breath sounds: No decreased breath sounds. Abdominal:      General: Bowel sounds are normal. There is no distension. Palpations: Abdomen is soft. There is no mass. Tenderness: There is no abdominal tenderness. There is no guarding. Musculoskeletal:         General: No tenderness. Cervical back: Neck supple. Comments: Edema to right ankle, no tenderness to palpation. Skin:     General: Skin is warm and dry. Neurological:      Mental Status: She is alert and oriented to person, place, and time. She is not disoriented. Cranial Nerves: No cranial nerve deficit. Psychiatric:         Speech: Speech normal.         Behavior: Behavior normal. Behavior is cooperative.          Investigations:      Laboratory Testing:  Recent Results (from the past 24 hour(s))   Protime-INR    Collection Time: 09/09/21  6:04 PM   Result Value Ref Range    Protime 18.0 (H) 11.5 - 14.2 sec    INR 1.5    CBC Auto Differential    Collection Time: 09/09/21  6:04 PM   Result Value Ref Range    WBC 8.3 3.5 - 11.3 k/uL    RBC 4.17 3.95 - 5.11 m/uL    Hemoglobin 13.2 11.9 - 15.1 g/dL    Hematocrit 40.7 36.3 - 47.1 %    MCV 97.6 82.6 - 102.9 fL    MCH 31.7 25.2 - 33.5 pg    MCHC 32.4 28.4 - 34.8 g/dL    RDW 12.5 11.8 - 14.4 %    Platelets 039 274 - 171 k/uL    MPV 11.3 8.1 - 13.5 fL    NRBC Automated 0.0 0.0 per 100 WBC    Differential Type NOT REPORTED     WBC Morphology NOT REPORTED     RBC Morphology NOT REPORTED     Platelet Estimate NOT REPORTED     Seg Neutrophils 74 (H) 36 - 65 %    Lymphocytes 13 (L) 24 - 43 %    Monocytes 11 3 - 12 %    Eosinophils % 1 1 - 4 %    Basophils 0 0 - 2 %    Immature Granulocytes 1 (H) 0 %    Segs Absolute 6.18 1.50 - 8.10 k/uL    Absolute Lymph # 1.05 (L) 1.10 - 3.70 k/uL    Absolute Mono # 0.92 0.10 - 1.20 k/uL    Absolute Eos # 0.10 0.00 - 0.44 k/uL    Basophils Absolute 0.03 0.00 - 0.20 k/uL    Absolute Immature Granulocyte 0.04 0.00 - 0.30 k/uL   Basic Metabolic Panel w/ Reflex to MG    Collection Time: 09/09/21  6:04 PM   Result Value Ref Range    Glucose 103 (H) 70 - 99 mg/dL    BUN 13 8 - 23 mg/dL    CREATININE 0.58 0.50 - 0.90 mg/dL    Bun/Cre Ratio 22 (H) 9 - 20    Calcium 9.8 8.6 - 10.4 mg/dL    Sodium 138 135 - 144 mmol/L    Potassium 3.7 3.7 - 5.3 mmol/L    Chloride 103 98 - 107 mmol/L    CO2 25 20 - 31 mmol/L    Anion Gap 10 9 - 17 mmol/L    GFR Non-African American >60 >60 mL/min    GFR African American >60 >60 mL/min    GFR Comment          GFR Staging NOT REPORTED    Urinalysis, Routine    Collection Time: 09/09/21  8:20 PM   Result Value Ref Range    Color, UA STRAW (A) YELLOW    Turbidity UA SLIGHTLY CLOUDY (A) CLEAR    Glucose, Ur NEGATIVE NEGATIVE    Bilirubin Urine NEGATIVE NEGATIVE    Ketones, Urine TRACE (A) Radiology. 2017; 284(1):228-43. CT Cervical Spine WO Contrast    Result Date: 9/9/2021  No acute bony abnormalities are noted. Multilevel lumbar spondylosis, facet arthropathy and degenerative disc disease as described above. Moderate central canal and lateral recess spinal stenosis at L3-4. Right foraminal stenosis at L2-3 and L3-4. RECOMMENDATIONS: Further evaluation may be obtained with MR imaging if clinically indicated. CT CERVICAL SPINE FINDINGS: The cervical spine demonstrates decreasedmineralization with  cervical lordosis. There is no evidence of acute fracture . Old fracture of the spinous process of C6. Grade 1 spondylolisthesis of C 4 with respect to C3. Fiona Gian There is loss of disc height with eburnation of the vertebral endplates at the F9-3, Z3-9, C5-6, C6-7 and C7-T1 levels. There are anterior and posterior marginal osteophytes at multiple levels. The central canal is grossly patent. There is bilateral facet hypertrophy at multiple levels throughout the cervical spine. The pedicles and posterior elements are otherwise intact. The prevertebral and paravertebral soft tissues are unremarkable. The atlanto-dens interval and dens are intact. The visualized lung apices are clear. Vascular calcifications are seen compatible with atherosclerotic disease. IMPRESSION: Multilevel cervical spondylosis and degenerative disc disease. Grade 1 spondylolisthesis of C 4 with respect to C3 which appears chronic RECOMMENDATIONS: Further evaluation should be obtained with MR imaging if clinically indicated. CT THORACIC SPINE WO CONTRAST    Result Date: 9/9/2021  No acute bony abnormalities are noted. Multilevel lumbar spondylosis, facet arthropathy and degenerative disc disease as described above. Moderate central canal and lateral recess spinal stenosis at L3-4. Right foraminal stenosis at L2-3 and L3-4. RECOMMENDATIONS: Further evaluation may be obtained with MR imaging if clinically indicated.  CT CERVICAL SPINE FINDINGS: The cervical spine demonstrates decreasedmineralization with  cervical lordosis. There is no evidence of acute fracture . Old fracture of the spinous process of C6. Grade 1 spondylolisthesis of C 4 with respect to C3. Kathrene Bolk There is loss of disc height with eburnation of the vertebral endplates at the X7-5, E2-6, C5-6, C6-7 and C7-T1 levels. There are anterior and posterior marginal osteophytes at multiple levels. The central canal is grossly patent. There is bilateral facet hypertrophy at multiple levels throughout the cervical spine. The pedicles and posterior elements are otherwise intact. The prevertebral and paravertebral soft tissues are unremarkable. The atlanto-dens interval and dens are intact. The visualized lung apices are clear. Vascular calcifications are seen compatible with atherosclerotic disease. IMPRESSION: Multilevel cervical spondylosis and degenerative disc disease. Grade 1 spondylolisthesis of C 4 with respect to C3 which appears chronic RECOMMENDATIONS: Further evaluation should be obtained with MR imaging if clinically indicated. CT LUMBAR SPINE WO CONTRAST    Result Date: 9/9/2021  No acute bony abnormalities are noted. Multilevel lumbar spondylosis, facet arthropathy and degenerative disc disease as described above. Moderate central canal and lateral recess spinal stenosis at L3-4. Right foraminal stenosis at L2-3 and L3-4. RECOMMENDATIONS: Further evaluation may be obtained with MR imaging if clinically indicated. CT CERVICAL SPINE FINDINGS: The cervical spine demonstrates decreasedmineralization with  cervical lordosis. There is no evidence of acute fracture . Old fracture of the spinous process of C6. Grade 1 spondylolisthesis of C 4 with respect to C3. Kathrene Bolk There is loss of disc height with eburnation of the vertebral endplates at the I9-2, K4-9, C5-6, C6-7 and C7-T1 levels. There are anterior and posterior marginal osteophytes at multiple levels.  The central canal is grossly patent. There is bilateral facet hypertrophy at multiple levels throughout the cervical spine. The pedicles and posterior elements are otherwise intact. The prevertebral and paravertebral soft tissues are unremarkable. The atlanto-dens interval and dens are intact. The visualized lung apices are clear. Vascular calcifications are seen compatible with atherosclerotic disease. IMPRESSION: Multilevel cervical spondylosis and degenerative disc disease. Grade 1 spondylolisthesis of C 4 with respect to C3 which appears chronic RECOMMENDATIONS: Further evaluation should be obtained with MR imaging if clinically indicated. CT ABDOMEN PELVIS W IV CONTRAST Additional Contrast? None    Result Date: 9/9/2021  There is a small foci of gas within the bladder and minimal adjacent stranding. Findings concerning for cystitis in the absence of recent instrumentation. Clinical correlation recommended. Mild interstitial thickening in the visualized lung bases may be related to mild pulmonary edema. Clinical correlation recommended. Moderate hiatal hernia. XR CHEST PORTABLE    Result Date: 9/9/2021  No acute cardiopulmonary disease       Assessment :      Hospital Problems         Last Modified POA    * (Principal) Declining functional status 9/10/2021 Yes    Chronic atrial fibrillation ([address] 9/9/2021 Yes    Dyslipidemia 9/9/2021 Yes    Essential hypertension 9/9/2021 Yes    Fall 9/9/2021 Yes          Plan:     Patient status inpatient in the  Med/Surge unit. 1. Extensive radiological workup reviewed. 2. No acute process appreciated on head CT, no fracture of right ankle appreciated. 3. Recommend outpatient follow up for monitoring of multiple pulmonary nodules. 4. UA not compelling for UTI, gas appreciated in bladder on CT. Will give 1x dose rocephin. 5. Afib- continue xarelto. 6. Dyslipidemia- continue atorvastatin. 7. Hypertension- continue lisinopril and hydrochlorothiazide.    8. Consult social work- patient would likely benefit from placement. 9. Monitor vital signs. 10. Follow chemistries. 11. Replete electrolytes as needed. 12. Telemetry. 13. General diet. 14. Activity as tolerated with assist.   15. PT/OT. Plan of care discussed with patient. Consultations:   IP CONSULT TO HOSPITALIST  IP CONSULT TO SOCIAL WORK    Patient is admitted as inpatient status because of co-morbidities listed above, severity of signs and symptoms as outlined, requirement for current medical therapies and most importantly because of direct risk to patient if care not provided in a hospital setting. Expected length of stay > 48 hours.     KERRY Lira - CNP  9/9/2021  10:14 PM    Copy sent to Dr. Antonia Acevedo MD

## 2021-09-10 NOTE — ED NOTES
Pt transferred into hospital bed and moved from ED9 to ED8. Patient has no signs or symptoms of acute distress at this time, remains on continuous telemetry and pulse ox monitoring.       Margarita Flores RN  09/10/21 7691

## 2021-09-10 NOTE — PROGRESS NOTES
Physical Therapy    DATE: 9/10/2021    NAME: Froilan Arceo  MRN: [id number]   : 1934    Patient not seen this date for Physical Therapy due to:  [] Blood transfusion in progress  [] Cancel by RN  [] Hemodialysis  []  Refusal by Patient   [] Spine Precautions   [] Strict Bedrest  [] Surgery  [] Testing      [x] Other (hold PT eval at this time. Spoke with KARYNA Valle & pt has R ankle pain due to fall and only L ankle x-ray performed. Awaiting clarification/orders & further testing as needed.)       [] PT being discontinued at this time. Patient independent. No further needs. [] PT being discontinued at this time as the patient has been transferred to hospice care. No further needs.     Renee Woody

## 2021-09-10 NOTE — FLOWSHEET NOTE
Patient is awake and alert as she sits in a chair. Patient is approachable and engages in conversation. Patient reports that she has family support from her spouse and children. Patient states that she is doing well. Patient denies any spiritual or emotional concerns. Writer provides supportive conversation. Patient appears to be coping adequately and expresses appreciation for the visit. Spiritual Care will follow as needed. 09/10/21 1400   Encounter Summary   Services provided to: Patient   Referral/Consult From: Rounding   Support System Spouse; Children   Continue Visiting   (9/10/21)   Complexity of Encounter Low   Length of Encounter 15 minutes   Spiritual Assessment Completed Yes   Routine   Type Initial   Assessment Approachable   Intervention Active listening;Explored coping resources;Nurtured hope   Outcome Expressed gratitude

## 2021-09-10 NOTE — CARE COORDINATION
Social work: Precert pending for [employer] at this time. [facility] completed and faxed. If authorization received over the weekend, SNF will call med-surg floor. VM left for Enrico/son to update [phone].

## 2021-09-10 NOTE — PROGRESS NOTES
Pt admitted to room 2006 per bed in stable condition from ER. Oriented to room and surroundings  Bed in lowest position, wheels locked, 2/4 side rails up  Call light in reach, room free of clutter, adequate lighting provided  Denies any further questions at this time  Instructed to call out with any questions/concerns/new onset of pain and/or n/v   White board updated  Continue to monitor with hourly rounding  STAY WITH ME protocol initiated   Bed alarm on/Fall Risk signs in place/Fall risk sticker to wrist band  Non-skid socks on/at bedside  [address] in place for patient/family to view & ask questions.

## 2021-09-10 NOTE — PROGRESS NOTES
Physical Therapy    Facility/Department: [facility]  Initial Assessment    NAME: Elizabeth Gonsales  : 1934  MRN: [id number]    Date of Service: 9/10/2021    Discharge Recommendations:  Patient would benefit from continued therapy after discharge       Pt admitted to ED on 21 via EMS from home secondary to fall. She states she fell yesterday and inured her right hip and ankle. She states she cannot bare weight on her right ankle. She denies laying on the floor, and states her  had her crawl to a chair and she managed to get into it. However, she could not get up because she could not put weight in her ankle. She states she has fell 2-3 times in the last week. No dizziness or lightheadedness. She denies dysuria. No additional symptomology or further modifying factors. She has past medical history that includes atrial fibrillation (xarelto), hypertension and dyslipidemia. She has an implanted pacemaker. Pt admitted to the hospital for management of declining functional status. Assessment   Body structures, Functions, Activity limitations: Decreased functional mobility ; Decreased ROM; Decreased strength;Decreased safe awareness;Decreased endurance;Decreased balance;Decreased posture  Assessment: Pt with deficits of bed mobility, transfers, ambulation, balance, cognition, safety awareness and endurance this session, & required max x2 assist for safe transfers, unable to safely ambulate & required max x2 assist with Justa Avalos to mobilize to chair gait. With current deficits, Pt HIGH risk for falls & requires continued PT to maximize independence with functional mobility, balance, safety awareness & activity tolerance. Pt currently functioning below baseline. Would suggest additional therapy at time of discharge to maximize long term outcomes and prevent re-admission. Please refer to AM-PAC score for current level of function.   Prognosis: Fair  Decision Making: High Complexity  Exam: ROM, MMT, functional mobility, activity tolerance, Balance, & [employer] AM-PAC 6 Clicks Basic Mobility  Clinical Presentation: Evolving  PT Education: Goals;PT Role;Plan of Care;Transfer Training;General Safety;Gait Training;Functional Mobility Training  Patient Education: Pt ed on importance of being up & OOB, edema mgt, circulation ex's, ankle pumps & pursed lip breathing. REQUIRES PT FOLLOW UP: Yes  Activity Tolerance  Activity Tolerance: Patient limited by pain  Activity Tolerance: Pt unable to bear weight through R ankle       Patient Diagnosis(es): The primary encounter diagnosis was Fall, initial encounter. Diagnoses of Acute right ankle pain, Generalized weakness, and Frequent falls were also pertinent to this visit. has a past medical history of Atrial fibrillation ([facility] [address] CAD (coronary artery disease), Dementia ([facility] [address] Hyperlipidemia, Hypertension, and S/P cardiac cath. has a past surgical history that includes pacemaker placement; Colonoscopy; and ablation of dysrhythmic focus. Restrictions  Restrictions/Precautions  Restrictions/Precautions: General Precautions, Fall Risk  Position Activity Restriction  Other position/activity restrictions:  Up with assist, alarms, RUE IV  Vision/Hearing  Vision: Impaired (pt denies changes)  Vision Exceptions: Wears glasses for reading  Hearing: Exceptions to [facility]  Hearing Exceptions: Hard of hearing/hearing concerns     Subjective  General  Chart Reviewed: Yes  Patient assessed for rehabilitation services?: Yes  Additional Pertinent Hx: Dementia, afib, HTN, hyperlipidemia, CAD  Response To Previous Treatment: Not applicable  General Comment  Comments: RN okays PT  Subjective  Subjective: Pt agreeable to PT  Pain Screening  Patient Currently in Pain: Yes  Pain Assessment  Pain Assessment: 0-10  Pain Level: 5  Pain Location: Ankle  Pain Orientation: Right  Vital Signs  Patient Currently in Pain: Yes       Orientation  Orientation  Overall Orientation Status: Within Functional Limits  Orientation Level: Oriented to place;Oriented to time;Oriented to situation;Oriented to person (pt with some slow & delayed processing, nonsense speech at times)  Social/Functional History  Social/Functional History  Lives With: Spouse  Type of Home: House  Home Layout: Two level, Able to Live on Main level with bedroom/bathroom (pt states she does not go upstairs)  Home Access: Stairs to enter without rails (front door; uses garage mainly with 3 stps with R HR)  Entrance Stairs - Number of Steps: 1  Bathroom Shower/Tub: Tub/Shower unit  Bathroom Toilet: Handicap height (RTS with rails; pt states she can reach the sink to assist)  Bathroom Equipment: Grab bars in shower  Home Equipment: Rolling walker  Receives Help From: Family (pt states her spouse and 2 sons are supportive)  ADL Assistance: Independent  Homemaking Assistance: Needs assistance (dtr in law assists with laundry/cleaning; pt states she cooks and spouse assists at times)  Homemaking Responsibilities: Yes  Ambulation Assistance: Independent (with RW)  Transfer Assistance: Independent  Active : No  Patient's  Info: pt states her son drives as needed  Occupation: Retired  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 Additional Comments: Pt states she had recent fall and was sweeping and got tangled and fell down. Cognition   Cognition  Overall Cognitive Status: Exceptions  Arousal/Alertness: Delayed responses to stimuli  Following Commands: Follows one step commands with repetition; Follows one step commands with increased time  Attention Span: Attends with cues to redirect  Memory: Decreased short term memory;Decreased recall of recent events  Safety Judgement: Decreased awareness of need for assistance;Decreased awareness of need for safety  Problem Solving: Assistance required to implement solutions;Assistance required to correct errors made;Assistance required to generate solutions;Assistance required to identify errors made;Decreased awareness of errors  Insights: Not aware of deficits  Initiation: Requires cues for some  Sequencing: Requires cues for all    Objective     Observation/Palpation  Posture: Poor  Observation: Pt resting in bed & appears comfortable. Bruises on R hip & under R axilla/arm. Dry skin & scabs on BLE noted. Edema: R ankle    AROM RLE (degrees)  RLE General AROM: ankle limited  AROM LLE (degrees)  LLE AROM : WFL  AROM RUE (degrees)  RUE General AROM: see OT assessment  AROM LUE (degrees)  LUE General AROM: see OT assessment  Strength RLE  Comment: 3/5 hip & knee  Strength LLE  Comment: 3/5 hip & knee  Strength RUE  Comment: see OT assessment  Strength LUE  Comment: see OT assessment  Strength Other  Other: question cognition & ability to follow MMT commands, continue to assess  Tone RLE  RLE Tone: Normotonic  Tone LLE  LLE Tone: Normotonic  Motor Control  Gross Motor?: WFL     Bed mobility  Rolling to Right: Minimal assistance  Supine to Sit: Minimal assistance  Sit to Supine: Unable to assess  Comment: Mod verbal instruction/tactile assist for proper log rolling tech, hand placement on bed rail, scooting EOB with B foot placement on floor & pursed lip breathing. Transfers  Sit to Stand: Maximum Assistance;2 Person Assistance  Stand to sit: Maximum Assistance;2 Person Assistance  Bed to Chair: Maximum assistance;2 Person Assistance  Comment: Max verbal instruction/tactile assist for proper use of upper body for safe sit/stand, pushing up from bed rather than walker, nose over toes tech & upright posture. Pt stood with max x2 assist & RW for 45 sec & unable to bear weight through RLE. [facility].  Pt completed [place] [facility] transfers with max x2 assist. Max verbal instruction/tactile assist for [place] management & pursed lip breathing. Ambulation  Ambulation?: No  Ambulation 1  Comments: Pt unable to bear weight through RLE & not appropriate for safe amb this session. Leticia Brooklyn required to mobilize to chair. Balance  Posture: Poor  Sitting - Static: Good  Sitting - Dynamic: Good;-  Standing - Static: Poor (RW & Leticia Brooklyn)  Standing - Dynamic: Poor (RW & Leticia Brooklyn)       All lines intact, call light within reach, and patient positioned comfortably at end of treatment. All patient needs addressed prior to ending therapy session. Plan   Plan  Times per week: 1-2x/d, 5-6d/wk  Current Treatment Recommendations: Strengthening, ROM, Balance Training, Functional Mobility Training, Transfer Training, Cognitive/Perceptual Training, Endurance Training, Gait Training, Neuromuscular Re-education, Safety Education & Training, Patient/Caregiver Education & Training  Safety Devices  Type of devices: Call light within reach, Chair alarm in place, Gait belt, Patient at risk for falls, Left in chair (Pt agreed to sit up in chair after ed on benefits. Pillow under BLE/elevated with stool to promote circulation & reduce edema as able.)    G-Code       OutComes Score                                                  AM-PAC Score  AM-PAC Inpatient Mobility Raw Score : 11 (09/10/21 1203)  AM-PAC Inpatient T-Scale Score : 33.86 (09/10/21 1203)  Mobility Inpatient CMS 0-100% Score: 72.57 (09/10/21 1203)  Mobility Inpatient CMS G-Code Modifier : CL (09/10/21 1203)          Goals  Short term goals  Time Frame for Short term goals: 12 visits  Short term goal 1: Increase bed mobility to mod I to enable pt to safely get in/OOB. Short term goal 2: Pt able to transfer from bed to chair with mod assist using safest device. Short term goal 3: Pt able to tolerate standing & bearing weight through RLE with mod assist for 10 min or >. Short term goal 4:  Increase strength to 4-/5 & standing balance to fair with RW to facilitate pt independence for performance of ADL's, functional mobility & reduce fall risk. Short term goal 5:  Initiate gait & pt able to amb 50ft or > indep w/ RW to enable pt to return to previous level of independence    Therapy Time   Individual Concurrent Group Co-treatment   Time In 1121         Time Out 1213         Minutes 52+10=62              Additional 10 minutes for chart review    Co-treatment with OT warranted secondary to decreased safety and independence requiring 2 skilled therapy professionals to address individual discipline's goals. PT addressing pre gait trunk strengthening, weight shifting prior to transfers, transfer training and postural control in sitting.       Yaquelin Willams               Evaluation, Treatment & documentation completed by Yaquelin Willams, Student DPT   co-signed by Jony Woodward, PT

## 2021-09-10 NOTE — CARE COORDINATION
Case Management Initial Discharge Plan  Nazanin Amezcua,         Readmission Risk              Risk of Unplanned Readmission:  9             Met with:patient and Enrico/son to discuss discharge plans. Information verified: address, contacts, phone number, , insurance Yes  PCP: Consuelo Cavanaugh MD  Date of last visit: few months ago    Insurance Provider: Jesenia Luz Medicare    Discharge Planning  Current Residence:  [facility] [address]:  Spouse/Significant Other   Home has 2 stories/4 stairs to climb  Support Systems:  Spouse/Significant Other, Children, Family Members  Current Services PTA:  [facility] Agency: [facility] for SN, PT, OT  Patient able to perform ADL's:Assisted  DME in home:  Walker, bp machine, pacermaker monitor  DME used to aid ambulation prior to admission:     DME used during admission:      Potential Assistance Needed:  Extended [place] [address]: [facility] [address] Medications:  No  Does patient want to participate in local refill/ meds to beds program?  No    Patient agreeable to home care: Yes  Freedom of choice provided:  yes      Type of Home Care Services:  Meals on Wheels, Nursing Services [facility])  Patient expects to be discharged to:       Prior SNF/Rehab Placement and Facility: none  Agreeable to SNF/Rehab: Yes  [place] of choice provided: yes   Evaluation: yes    Expected Discharge date: Follow Up Appointment: Best Day/ Time:      Transportation provider: family  Transportation arrangements needed for discharge: Yes    Discharge Plan: Met with patient and Enrico/son at bedside to complete discharge assessment. Patient lives with spouse in 2 story home, do not use stairs in the home. Supportive family, Massiel Zhang, son is also POA. Pt agreed to SNF- choice given of [facility]. Referral sent. Son requesting to speak with Yady/MARY to discuss medicaid application process.            Electronically signed by West Rodriguez, LSW on 9/10/21 at 10:22 AM EDT

## 2021-09-10 NOTE — PROGRESS NOTES
Direct oral anticoagulant (DOAC) - Initial Pharmacy Review  PLAN    No obvious intervention needed. Jose Mcneil, [facility]  9/10/2021  11:52 AM    ASSESSMENT   Patient chart reviewed for indication and appropriateness of dose and therapy of Rivaroxaban.:  Indication: AFib. PATIENT INFORMATION   Body mass index is 28.08 kg/m². Wt Readings from Last 1 Encounters:   21 174 lb (78.9 kg)     Some sources recommend that DOACs be avoided in weight < 50 or > 120 kg, or BMI > 40 whenever possible; however. some smaller studies suggest that DOACs may be safe and efficacious in this population. Recent Labs     21  1804 09/10/21  0454   CREATININE 0.58 0.53     Recent Labs     21  1804 09/10/21  0454   HGB 13.2 11.9   HCT 40.7 36.9    142     Recent Labs     21  1804 09/10/21  0454   INR 1.5 2.5         Weight  kg ? BMI Less than   40 kg/m2 Calculated CrCl  Using ABW (mL/min) Other anticoagulants on MAR Drug interactions  (since admission) reviewed   yes    Wt Readings from Last 1 Encounters:   21 174 lb (78.9 kg)    yes    Body mass index is 28.08 kg/m². 79 ml/min        (140-age)*ABW    72 * sCr    X 0.85 for females No concurrent anticoagulants ordered.  No interactions/no new drug interactions identified requiring action.                 -----------------------------------------------------------------------------------------------------------------    CRCL Calculation for DOACs  (use ABW)    CrCl male:  (140-Age) * ABW           For female:  (140-Age) * ABW * 0.85                       72  *  sCr                                              72 * sCr           Rivaroxaban (Xarelto®)  - (CrCl calculated on ABW)  Indication Dose (Oral)  Adjustment  (CrCl calculated on ABW) Drug Interactions   NVAF 20mg daily   (with evening meal) CrCl <  50 mL/min = 15mg/day (with evening meal)   Strong P-gp/CY inducers (Avoid combination  Apalutamide, CarBAMazepine, Enzalutamide, Fosphenytoin, Lumacaftor, Mitotane, PHENobarbital, Phenytoin, Primidone, RifAMPin    P-gp/CY inhibitors (Avoid combination)  Clarithromycin, Itraconazole, Ketoconazole (systemic), Ombitasvir, Paritaprevir, Ritonavir, Obed's wort   DVT/PE Treatment 15mg BID x21d, then 20mg daily (take with food) CrCl< 15 =Avoid use    DVT/PE  Recurrence Preventions 20mg daily (or 10 mg daily  after at least 6 months of treatment) CrCl< 15 =Avoid use    DVT prevention PostOp Knee: 10mg daily x 12-14d   (up to 35 days suggested)  Hip: 10mg daily x 35 days CrCl <15 =Avoid use    CAD/PAD  (Chronic, stable) 2.5mg BID with once daily ASA 75-100mg No adjustment for > 15 mL/min. Use with caution in severe renal impairment. * Robert ANDERSON et al. J Am Soc Nephrol 2017. doi:10.1681/ASN. [id number]  *Myrna HARRISON, et al. Circulation 2018.  JLP:56.7489/[id number]  Mayra Master et al. [facility] 2019  Delta  M, et al. Blood 5690;[id number]  Bj Syed, et al. CHEST 2018  David Phoenix et al. [facility] 2019  [facility]/elijah GRANADOS. Circulation 2018

## 2021-09-10 NOTE — PROGRESS NOTES
Occupational Therapy   Occupational Therapy Initial Assessment  Date: 9/10/2021   Patient Name: Cynthia Kaur  MRN: [id number]     : 1934    KARYNA Mcpherson reports patient is medically stable for therapy treatment this date. Chart reviewed prior to treatment and patient is agreeable for therapy. All lines intact and patient positioned comfortably at end of treatment. All patient needs addressed prior to ending therapy session. Pt currently functioning below baseline. Would suggest additional therapy at time of discharge to maximize long term outcomes and prevent re-admission. Please refer to AM-PAC score for current level of function. Date of Service: 9/10/2021    Discharge Recommendations:  Patient would benefit from continued therapy after discharge  OT Equipment Recommendations  Equipment Needed:  (continue to assess)    Assessment   Performance deficits / Impairments: Decreased functional mobility ; Decreased safe awareness;Decreased balance;Decreased coordination;Decreased ADL status; Decreased cognition;Decreased vision/visual deficit; Decreased posture;Decreased endurance;Decreased high-level IADLs;Decreased strength;Decreased fine motor control  Assessment: At this time, pt is a high fall risk and requires max assist of 2 in harvey stedy lift for balance support/safety. Skilled OT is indicated to maximize functional I and safety as able to return home with assist as needed.   Prognosis: Fair  Decision Making: High Complexity  OT Education: OT Role;Transfer Training;Plan of Care;Energy Conservation  Patient Education: proper bed mob tech, pursed lip breathing, safety in function, call light use/fall prevention, recommendations for continued therapy, postural control and weight shifting, edema mgt tech  Barriers to Learning: memory and cognitive deficits  REQUIRES OT FOLLOW UP: Yes  Activity Tolerance  Activity Tolerance: Patient limited by fatigue;Patient limited by pain;Treatment limited secondary to decreased cognition  Activity Tolerance: poor/plus; pt unable to moreno weight through RLE with standing  Safety Devices  Safety Devices in place: Yes  Type of devices: Call light within reach; Chair alarm in place; Left in chair;Patient at risk for falls;Gait belt;Nurse notified (BLE'e elevated/pillow under to increase overall comfort. Pt was edu on benefits of elevation/ankle AROM/pumping as able to reduce swelling. Pt with fair understanding.)           Patient Diagnosis(es): The primary encounter diagnosis was Fall, initial encounter. Diagnoses of Acute right ankle pain, Generalized weakness, and Frequent falls were also pertinent to this visit. has a past medical history of Atrial fibrillation ([facility] [address] CAD (coronary artery disease), Dementia ([address] Hyperlipidemia, Hypertension, and S/P cardiac cath. has a past surgical history that includes pacemaker placement; Colonoscopy; and ablation of dysrhythmic focus. PER H&P: Ilsa Alexandre is a 80 y.o. Non- / non  female who presents with Ankle Pain (Rt ankle pain; no deformity noted)   and is admitted to the hospital for the management of Declining functional status.     The patient presents to the hospital with complaint of fall at home. She states she fell yesterday and inured her right hip and ankle. She states she cannot bare weight on her right ankle. She denies laying on the floor, and states her  had her crawl to a chair and she managed to get into it. However, she could not get up because she could not put weight in her ankle. She states she has fell 2-3 times in the last week. No dizziness or lightheadedness. She denies dysuria. No additional symptomology or further modifying factors. She has past medical history that includes atrial fibrillation (xarelto), hypertension and dyslipidemia.  She has an implanted pacemaker    Restrictions  Restrictions/Precautions  Restrictions/Precautions: General Precautions, Fall Risk  Position Activity Restriction  Other position/activity restrictions: Up with assist, alarms, RUE IV, pacer     Subjective   General  Chart Reviewed: Yes  Patient assessed for rehabilitation services?: Yes  Family / Caregiver Present: No  Patient Currently in Pain: Yes  Intervention List: Nurse/Physician notified  Comments / Details: Pt c/o R ankle pain and rates 5/10. Social/Functional History  Social/Functional History  Lives With: Spouse  Type of Home: House  Home Layout: Two level, Able to Live on Main level with bedroom/bathroom (pt states she does not go upstairs)  Home Access: Stairs to enter without rails (front door; uses garage mainly with 3 stps with R HR)  Entrance Stairs - Number of Steps: 1  Bathroom Shower/Tub: Tub/Shower unit  Bathroom Toilet: Handicap height (RTS with rails; pt states she can reach the sink to assist)  Bathroom Equipment: Grab bars in shower  Home Equipment: Rolling walker  Receives Help From: Family (pt states her spouse and 2 sons are supportive)  ADL Assistance: Independent  Homemaking Assistance: Needs assistance (dtr in law assists with laundry/cleaning; pt states she cooks and spouse assists at times)  Homemaking Responsibilities: Yes  Ambulation Assistance: Independent (with RW)  Transfer Assistance: Independent  Active : No  Patient's  Info: pt states her son drives as needed  Occupation: Retired  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 Additional Comments: Pt states she had recent fall and was sweeping and got tangled and fell down.        Objective   Vision: Impaired (pt denies changes)  Vision Exceptions: Wears glasses for reading  Hearing: Exceptions to [facility]  Hearing Exceptions: Hard of hearing/hearing concerns    Orientation  Overall Orientation Status: Within Functional Limits (pt with some slow and delayed processing however with some nonsense speech at times/confusion noted)  Observation/Palpation  Posture: Poor (with RW and in harvey stedy; kyphotic posture)  Observation: R hip/under R axilla/arm bruises as well and  BLE scabs noted, LUE IV  Edema: R ankle  Balance  Sitting Balance: Contact guard assistance  Standing Balance: Maximum assistance (x2 static stand with RW and in harvey stedy)  Standing Balance  Time: stand moreno < 45 seconds with RW  Functional Mobility  Functional Mobility Comments: Pt unable to moreno WB to RLE; static stand with RW and max assist of 2 and harvey used for transfer  Toilet Transfers  Toilet Transfers Comments: N/T and pt with no needs     ADL  Feeding: Setup  Grooming: Setup;Minimal assistance  UE Bathing: Setup;Stand by assistance  LE Bathing: Setup;Dependent/Total  UE Dressing: Setup; Moderate assistance (with hosp gown)  LE Dressing: Setup;Dependent/Total;Maximum assistance (max assist with B socks supine in bed)  Toileting: Dependent/Total  Additional Comments: *Pt is DEP with 2 staff assist when up with harvey stedy for safety/balance. Tone RUE  RUE Tone: Normotonic  Tone LUE  LUE Tone: Normotonic  Coordination  Movements Are Fluid And Coordinated: No  Coordination and Movement description: Fine motor impairments     Bed mobility  Rolling to Right: Minimal assistance  Supine to Sit: Minimal assistance  Sit to Supine: Unable to assess (pt was agreeable after OT education/benefits of being up OOB as able to sit in chair for lunch)  Comment: MOD verbal instruction/tactile assist for pursed lip  breathing and hand placement on bed rail, scooting forward to edge of bed to place BLE's on floor.   Transfers  Stand Step Transfers: Dependent/Total;Maximum assistance;2 Person assistance (bed to chair with use of harvey stedy)  Sit to stand: Maximum assistance;2 Person assistance  Stand to sit: Maximum assistance;2 Person assistance  Transfer Comments: MAX verbal instruction/tactile assist for B hand placement on harvey stedy, nose over toes tech, safety features of lift, pursed lip breathing, weight shifting, upright posture, controlled stand to sit to increase overall safety. Cognition  Overall Cognitive Status: Exceptions  Arousal/Alertness: Delayed responses to stimuli  Following Commands: Follows one step commands with repetition; Follows one step commands with increased time  Attention Span: Attends with cues to redirect  Memory: Decreased short term memory;Decreased recall of recent events  Safety Judgement: Decreased awareness of need for assistance;Decreased awareness of need for safety  Problem Solving: Assistance required to implement solutions;Assistance required to correct errors made;Assistance required to generate solutions;Assistance required to identify errors made;Decreased awareness of errors  Insights: Not aware of deficits  Initiation: Requires cues for some  Sequencing: Requires cues for all  Perception  Overall Perceptual Status: WFL     Sensation  Overall Sensation Status: WFL        LUE AROM (degrees)  LUE AROM : WFL  RUE AROM (degrees)  RUE AROM : WFL  LUE Strength  Gross LUE Strength: WFL  LUE Strength Comment: BUE strength grossly 4/5  RUE Strength  Gross RUE Strength: WFL                   Plan   Plan  Times per week: 4-5x/week 1x/day as moreno  Current Treatment Recommendations: Strengthening, Balance Training, Functional Mobility Training, Safety Education & Training, Positioning, Home Management Training, Cognitive/Perceptual Training, Pain Management, Equipment Evaluation, Education, & procurement, Self-Care / ADL, Endurance Training, Neuromuscular Re-education, Patient/Caregiver Education & Training                                                    AM-PAC Score   12    Co-treatment with PT warranted secondary to decreased safety and independence requiring 2 skilled therapy professionals to address individual discipline's goals.  OT addressing preparation for ADL transfer, sitting and standing balance for increased ADL performance, sitting/activity tolerance, functional reaching, environmental safety/scanning, fall prevention, ability to sequence and follow directions and functional UE strength. Goals  Short term goals  Time Frame for Short term goals: by discharge, pt to demo  Short term goal 1: bed mob tasks with use of rail as needed to SBA. Short term goal 2: increase BUE strength by a 1/2 grade to assist with ADL's. Short term goal 3: UB ADL to set up and LB ADL to max assist of 1 and use of AD/bed rail as needed. Short term goal 4: ADL transfers with use of lift/AD as needed to max assist of 1(add functional mob goal as appropriate)  Short term goal 5: toileting with use of BSC/lift and AD as needed to max assist of 1. Long term goals  Long term goal 1: Pt to stand with min assist and AD moreno > 5 mins as able to reduce falls in function. Long term goal 2: Caregivers to be I with pressure relief, BUE HEP, fall prevention, EC/WS tech and DME/AE and AD recommendations with use of handouts. Patient Goals   Patient goals : After 3 choices given pt stated to be able to walk better!        Therapy Time   Individual Concurrent Group Co-treatment   Time In 1121 (plus 10 min chart review/RN communication)         Time Out 1213         Minutes 52         Timed Code Treatment Minutes: 270 Kristy Aponte

## 2021-09-10 NOTE — PROGRESS NOTES
Occupational Therapy  DATE: 9/10/2021    NAME: Yasmeen Cuevas  MRN: [id number]   : 1934    Patient not seen this date for Occupational Therapy due to:  [] Blood transfusion in progress  [] Cancel by RN  [] Hemodialysis  []  Refusal by Patient   [] Spine Precautions   [] Strict Bedrest  [] Surgery  [] Testing      [x] Other-9/10 hold eval and spoke with RN [facility] that pt has R ankle pain due to fall and only L ankle was xrayed/RN to call MD and will await for clarification/orders and further testing as needed        [] PT being discontinued at this time. Patient independent. No further needs. [] PT being discontinued at this time as the patient has been transferred to hospice care. No further needs.     Mary Little, OT

## 2021-09-10 NOTE — ED PROVIDER NOTES
Medical Decision Making: The patient was initially seen by Dr. Bess Garcia and signed out to me after discussing the case with her thoroughly. The patient has been having frequent falls and apparently could not get up today and laid in her urine and stool all day. Her  also has dementia and is unable to care for her. She is being admitted and will have  consultation. Radiographic work-up is negative. Findings are discussed thoroughly with the patient and her son. XR ANKLE LEFT (MIN 3 VIEWS)    Result Date: 9/9/2021  EXAMINATION: THREE XRAY VIEWS OF THE LEFT ANKLE 9/9/2021 6:16 pm COMPARISON: None HISTORY: ORDERING SYSTEM PROVIDED HISTORY: fall TECHNOLOGIST PROVIDED HISTORY: fall Reason for Exam: fall, ankle pain. patient is on coumadin. Acuity: Acute Type of Exam: Initial FINDINGS: Soft tissue swelling is identified at the ankle. No fracture or dislocation is identified. Osteopenia. Peripheral arterial disease is identified. No osseous destructive process is identified. Calcaneal spurring is seen at the plantar aponeurosis. Calcific change seen within the Achilles tendon which can be seen with calcific tendinitis. Mild degenerative change in the midfoot. Peripheral arterial disease. Soft tissue swelling is identified at the ankle, though no fracture or dislocation is identified. CT Head WO Contrast    Result Date: 9/9/2021  EXAMINATION: CT OF THE HEAD WITHOUT CONTRAST  9/9/2021 3:55 pm TECHNIQUE: CT of the head was performed without the administration of intravenous contrast. Dose modulation, iterative reconstruction, and/or weight based adjustment of the mA/kV was utilized to reduce the radiation dose to as low as reasonably achievable.  COMPARISON: 12/05/2019 HISTORY: ORDERING SYSTEM PROVIDED HISTORY: fall on coumadin TECHNOLOGIST PROVIDED HISTORY: fall on coumadin Decision Support Exception - unselect if not a suspected or confirmed emergency medical condition->Emergency Medical Condition (MA) Reason for Exam: Pt fell yesterday, takes coumadin, c/o low back pain and that she possibly hit her low back. H/O pacemaker Acuity: Acute Type of Exam: Initial FINDINGS: BRAIN/VENTRICLES: The cerebral hemispheres, brainstem, and cerebellum have a normal appearance for the patient's age. The falx is midline. The ventricles and peripheral sulci are mildly dilated. There is decreased attenuation in the periventricular white matter. There is no sign of a space occupying lesion, infarction, or hemorrhage. Orbits: Portion of the orbits demonstrate no acute abnormality. SINUSES: . The  imaged portions of the paranasal sinuses are clear. The mastoids and the middle ear chambers are clear. SOFT TISSUES/SKULL:  No acute abnormality of the visualized skull or soft tissues. Vascular calcifications are seen compatible with atherosclerotic disease. Mild central and cortical cerebral atrophy. Mild chronic deep white matter ischemic changes No acute intracranial abnormalities are noted. CT CHEST WO CONTRAST    Result Date: 9/9/2021  EXAMINATION: CT OF THE CHEST WITHOUT CONTRAST 9/9/2021 6:55 pm TECHNIQUE: CT of the chest was performed without the administration of intravenous contrast. Multiplanar reformatted images are provided for review. Dose modulation, iterative reconstruction, and/or weight based adjustment of the mA/kV was utilized to reduce the radiation dose to as low as reasonably achievable. COMPARISON: None. HISTORY: ORDERING SYSTEM PROVIDED HISTORY: fall on coumadin TECHNOLOGIST PROVIDED HISTORY: fall on coumadin Decision Support Exception - unselect if not a suspected or confirmed emergency medical condition->Emergency Medical Condition (MA) Reason for Exam: Pt fell yesterday, takes coumadin, c/o low back pain and that she possibly hit her low back.   H/O pacemaker Acuity: Acute Type of Exam: Initial FINDINGS: Mediastinum: The heart is enlarged without pericardial effusion. Cardiac pacing leads are noted. Coronary artery atherosclerosis. Thoracic aorta and main pulmonary artery are normal in caliber. No mediastinal lymphadenopathy. Lungs/pleura: There is no pleural effusion. There is no pneumothorax. There is subsegmental atelectasis within the lung bases. There is some traction bronchiectasis in both lower lobes. There are noncalcified pulmonary nodules. The largest of these is a ground-glass nodule in the left upper lobe measuring 5 mm. Upper Abdomen: No acute abnormality in the visualized upper abdomen. There are multiple hypoattenuating hepatic lesions measuring up to 2.1 cm in the left hepatic lobe, most consistent with a cyst.  Several of the smaller lesions are too small to accurately characterize. Moderate hiatal hernia. Soft Tissues/Bones: Multilevel degenerative changes of the thoracic spine. No acute fracture. 1. Solid and subsolid noncalcified pulmonary nodules measuring up to 5 mm. These could be infectious or inflammatory, but short interval follow-up is recommended. 2. Cardiomegaly. Coronary artery atherosclerosis. RECOMMENDATIONS: Multiple pulmonary nodules. Most severe: 5 mm left ground-glass pulmonary nodule within the upper lobe. Recommend a non-contrast Chest CT at 3-6 months. If stable, consider follow-up non-contrast Chest CTs at 2 and 4 years. These guidelines do not apply to immunocompromised patients and patients with cancer. Follow up in patients with significant comorbidities as clinically warranted. For lung cancer screening, adhere to Lung-RADS guidelines. Reference: Radiology. 2017; 284(1):228-43.      CT Cervical Spine WO Contrast    Result Date: 9/9/2021  EXAMINATION: CT OF THE THORACIC SPINE WITHOUT CONTRAST; CT OF THE CERVICAL SPINE WITHOUT CONTRAST; CT OF THE LUMBAR SPINE WITHOUT CONTRAST  9/9/2021 3:56 pm; 9/9/2021 3:55 pm: TECHNIQUE: CT of the thoracic spine was performed without the administration of intravenous contrast. Multiplanar reformatted images are provided for review. Dose modulation, iterative reconstruction, and/or weight based adjustment of the mA/kV was utilized to reduce the radiation dose to as low as reasonably achievable.; CT of the cervical spine was performed without the administration of intravenous contrast. Multiplanar reformatted images are provided for review. Dose modulation, iterative reconstruction, and/or weight based adjustment of the mA/kV was utilized to reduce the radiation dose to as low as reasonably achievable.; CT of the lumbar spine was performed without the administration of intravenous contrast. Multiplanar reformatted images are provided for review. Adjustment of mA and/or kV according to patient size was utilized. Dose modulation, iterative reconstruction, and/or weight based adjustment of the mA/kV was utilized to reduce the radiation dose to as low as reasonably achievable. COMPARISON: None. HISTORY: ORDERING SYSTEM PROVIDED HISTORY: fall TECHNOLOGIST PROVIDED HISTORY: fall Reason for Exam: Pt fell yesterday, takes coumadin, c/o low back pain and that she possibly hit her low back. H/O pacemaker Acuity: Acute Type of Exam: Initial; ORDERING SYSTEM PROVIDED HISTORY: fall TECHNOLOGIST PROVIDED HISTORY: fall Decision Support Exception - unselect if not a suspected or confirmed emergency medical condition->Emergency Medical Condition (MA) Reason for Exam: Pt fell yesterday, takes coumadin, c/o low back pain and that she possibly hit her low back. H/O pacemaker Acuity: Acute Type of Exam: Initial CT THORACIC AND LUMBAR SPINE FINDINGS: BONES/ALIGNMENT: There is mild scoliosis of the thoracic and lumbar spine. The vertebral body heights are maintained. No osseous destructive lesion is seen. Diffuse osteopenia. DEGENERATIVE CHANGES: Multilevel thoracic spondylosis and degenerative disc disease.   Degenerative disc disease noted at L1-2, L2-3, L3-4 , L4-L5 and L5-S1 with loss of height, partial vacuum phenomenon and early sclerosis of the articulating endplates. There are posterior marginal osteophytes with associated broad-based disc bulges at these levels with associated facet hypertrophy and ligamentous hypertrophy producing moderate central canal and lateral recess spinal stenosis at L3-4. Right foraminal stenosis at  L2-3 and L3-4. The remaining neural foramina appear to remain patent. SOFT TISSUES/RETROPERITONEUM: The paraspinal soft tissue show no gross abnormalities. No paraspinal mass is seen. Vascular calcifications are seen compatible with atherosclerotic disease. No acute bony abnormalities are noted. Multilevel lumbar spondylosis, facet arthropathy and degenerative disc disease as described above. Moderate central canal and lateral recess spinal stenosis at L3-4. Right foraminal stenosis at L2-3 and L3-4. RECOMMENDATIONS: Further evaluation may be obtained with MR imaging if clinically indicated. CT CERVICAL SPINE FINDINGS: The cervical spine demonstrates decreasedmineralization with  cervical lordosis. There is no evidence of acute fracture . Old fracture of the spinous process of C6. Grade 1 spondylolisthesis of C 4 with respect to C3. Joanne Graven There is loss of disc height with eburnation of the vertebral endplates at the Z5-8, C5-3, C5-6, C6-7 and C7-T1 levels. There are anterior and posterior marginal osteophytes at multiple levels. The central canal is grossly patent. There is bilateral facet hypertrophy at multiple levels throughout the cervical spine. The pedicles and posterior elements are otherwise intact. The prevertebral and paravertebral soft tissues are unremarkable. The atlanto-dens interval and dens are intact. The visualized lung apices are clear. Vascular calcifications are seen compatible with atherosclerotic disease. IMPRESSION: Multilevel cervical spondylosis and degenerative disc disease.  Grade 1 spondylolisthesis of C 4 with respect to C3 which appears chronic RECOMMENDATIONS: Further evaluation should be obtained with MR imaging if clinically indicated. CT THORACIC SPINE WO CONTRAST    Result Date: 9/9/2021  EXAMINATION: CT OF THE THORACIC SPINE WITHOUT CONTRAST; CT OF THE CERVICAL SPINE WITHOUT CONTRAST; CT OF THE LUMBAR SPINE WITHOUT CONTRAST  9/9/2021 3:56 pm; 9/9/2021 3:55 pm: TECHNIQUE: CT of the thoracic spine was performed without the administration of intravenous contrast. Multiplanar reformatted images are provided for review. Dose modulation, iterative reconstruction, and/or weight based adjustment of the mA/kV was utilized to reduce the radiation dose to as low as reasonably achievable.; CT of the cervical spine was performed without the administration of intravenous contrast. Multiplanar reformatted images are provided for review. Dose modulation, iterative reconstruction, and/or weight based adjustment of the mA/kV was utilized to reduce the radiation dose to as low as reasonably achievable.; CT of the lumbar spine was performed without the administration of intravenous contrast. Multiplanar reformatted images are provided for review. Adjustment of mA and/or kV according to patient size was utilized. Dose modulation, iterative reconstruction, and/or weight based adjustment of the mA/kV was utilized to reduce the radiation dose to as low as reasonably achievable. COMPARISON: None. HISTORY: ORDERING SYSTEM PROVIDED HISTORY: fall TECHNOLOGIST PROVIDED HISTORY: fall Reason for Exam: Pt fell yesterday, takes coumadin, c/o low back pain and that she possibly hit her low back. H/O pacemaker Acuity: Acute Type of Exam: Initial; ORDERING SYSTEM PROVIDED HISTORY: fall TECHNOLOGIST PROVIDED HISTORY: fall Decision Support Exception - unselect if not a suspected or confirmed emergency medical condition->Emergency Medical Condition (MA) Reason for Exam: Pt fell yesterday, takes coumadin, c/o low back pain and that she possibly hit her low back.   H/O pacemaker Acuity: Acute Type of Exam: Initial CT THORACIC AND LUMBAR SPINE FINDINGS: BONES/ALIGNMENT: There is mild scoliosis of the thoracic and lumbar spine. The vertebral body heights are maintained. No osseous destructive lesion is seen. Diffuse osteopenia. DEGENERATIVE CHANGES: Multilevel thoracic spondylosis and degenerative disc disease. Degenerative disc disease noted at L1-2, L2-3, L3-4 , L4-L5 and L5-S1 with loss of height, partial vacuum phenomenon and early sclerosis of the articulating endplates. There are posterior marginal osteophytes with associated broad-based disc bulges at these levels with associated facet hypertrophy and ligamentous hypertrophy producing moderate central canal and lateral recess spinal stenosis at L3-4. Right foraminal stenosis at  L2-3 and L3-4. The remaining neural foramina appear to remain patent. SOFT TISSUES/RETROPERITONEUM: The paraspinal soft tissue show no gross abnormalities. No paraspinal mass is seen. Vascular calcifications are seen compatible with atherosclerotic disease. No acute bony abnormalities are noted. Multilevel lumbar spondylosis, facet arthropathy and degenerative disc disease as described above. Moderate central canal and lateral recess spinal stenosis at L3-4. Right foraminal stenosis at L2-3 and L3-4. RECOMMENDATIONS: Further evaluation may be obtained with MR imaging if clinically indicated. CT CERVICAL SPINE FINDINGS: The cervical spine demonstrates decreasedmineralization with  cervical lordosis. There is no evidence of acute fracture . Old fracture of the spinous process of C6. Grade 1 spondylolisthesis of C 4 with respect to C3. Luis Fernando Hilt There is loss of disc height with eburnation of the vertebral endplates at the X5-0, P4-4, C5-6, C6-7 and C7-T1 levels. There are anterior and posterior marginal osteophytes at multiple levels. The central canal is grossly patent.   There is bilateral facet hypertrophy at multiple levels throughout the cervical back pain and that she possibly hit her low back. H/O pacemaker Acuity: Acute Type of Exam: Initial; ORDERING SYSTEM PROVIDED HISTORY: fall TECHNOLOGIST PROVIDED HISTORY: fall Decision Support Exception - unselect if not a suspected or confirmed emergency medical condition->Emergency Medical Condition (MA) Reason for Exam: Pt fell yesterday, takes coumadin, c/o low back pain and that she possibly hit her low back. H/O pacemaker Acuity: Acute Type of Exam: Initial CT THORACIC AND LUMBAR SPINE FINDINGS: BONES/ALIGNMENT: There is mild scoliosis of the thoracic and lumbar spine. The vertebral body heights are maintained. No osseous destructive lesion is seen. Diffuse osteopenia. DEGENERATIVE CHANGES: Multilevel thoracic spondylosis and degenerative disc disease. Degenerative disc disease noted at L1-2, L2-3, L3-4 , L4-L5 and L5-S1 with loss of height, partial vacuum phenomenon and early sclerosis of the articulating endplates. There are posterior marginal osteophytes with associated broad-based disc bulges at these levels with associated facet hypertrophy and ligamentous hypertrophy producing moderate central canal and lateral recess spinal stenosis at L3-4. Right foraminal stenosis at  L2-3 and L3-4. The remaining neural foramina appear to remain patent. SOFT TISSUES/RETROPERITONEUM: The paraspinal soft tissue show no gross abnormalities. No paraspinal mass is seen. Vascular calcifications are seen compatible with atherosclerotic disease. No acute bony abnormalities are noted. Multilevel lumbar spondylosis, facet arthropathy and degenerative disc disease as described above. Moderate central canal and lateral recess spinal stenosis at L3-4. Right foraminal stenosis at L2-3 and L3-4. RECOMMENDATIONS: Further evaluation may be obtained with MR imaging if clinically indicated. CT CERVICAL SPINE FINDINGS: The cervical spine demonstrates decreasedmineralization with  cervical lordosis.  There is no evidence of acute fracture . Old fracture of the spinous process of C6. Grade 1 spondylolisthesis of C 4 with respect to C3. Bula Dayhoff There is loss of disc height with eburnation of the vertebral endplates at the S0-3, N4-2, C5-6, C6-7 and C7-T1 levels. There are anterior and posterior marginal osteophytes at multiple levels. The central canal is grossly patent. There is bilateral facet hypertrophy at multiple levels throughout the cervical spine. The pedicles and posterior elements are otherwise intact. The prevertebral and paravertebral soft tissues are unremarkable. The atlanto-dens interval and dens are intact. The visualized lung apices are clear. Vascular calcifications are seen compatible with atherosclerotic disease. IMPRESSION: Multilevel cervical spondylosis and degenerative disc disease. Grade 1 spondylolisthesis of C 4 with respect to C3 which appears chronic RECOMMENDATIONS: Further evaluation should be obtained with MR imaging if clinically indicated. CT ABDOMEN PELVIS W IV CONTRAST Additional Contrast? None    Result Date: 9/9/2021  EXAMINATION: CT OF THE ABDOMEN AND PELVIS WITH CONTRAST 9/9/2021 6:55 pm TECHNIQUE: CT of the abdomen and pelvis was performed with the administration of intravenous contrast. Multiplanar reformatted images are provided for review. Dose modulation, iterative reconstruction, and/or weight based adjustment of the mA/kV was utilized to reduce the radiation dose to as low as reasonably achievable. COMPARISON: None. HISTORY: ORDERING SYSTEM PROVIDED HISTORY: fall on coumadin, bruising TECHNOLOGIST PROVIDED HISTORY: fall on coumadin, bruising Decision Support Exception - unselect if not a suspected or confirmed emergency medical condition->Emergency Medical Condition (MA) Reason for Exam: Pt fell yesterday, takes coumadin, c/o low back pain and that she possibly hit her low back.   H/O pacemaker Acuity: Acute Type of Exam: Initial FINDINGS: Lower Chest: There is mild interstitial thickening the visualized lung bases. Organs: The liver, spleen, pancreas, and adrenal glands are without acute process. There are several well-circumscribed low-attenuation lesions in the liver. The largest measures up to 2.4 cm and is fluid density, consistent with a cyst.  The others are too small to fully characterize, also likely cysts. The gallbladder is distended, otherwise unremarkable. There is mild nodularity of the adrenal glands which maintain their reniform shape. Both kidneys are symmetric in size and enhancement. No evidence of hydronephrosis or hydroureter. GI/Bowel: There is a moderate hiatal hernia. Stomach is otherwise unremarkable. The small and large bowel are normal in caliber. No areas of focal wall thickening or obstruction are identified. Moderate stool burden. The appendix is visualized and appears normal. Pelvis: Small foci of gas is seen within the nondependent portion of the bladder. There is minimal perivesical stranding along the dome of the bladder. Uterus and adnexa are without acute process. Peritoneum/Retroperitoneum: The visualized vasculature is without acute process. No lymphadenopathy, free intraperitoneal air, free fluid, or focal fluid collection identified. Bones/Soft Tissues: There multilevel degenerative changes in the spine. Soft tissues and osseous structures are without acute process. There is a small foci of gas within the bladder and minimal adjacent stranding. Findings concerning for cystitis in the absence of recent instrumentation. Clinical correlation recommended. Mild interstitial thickening in the visualized lung bases may be related to mild pulmonary edema. Clinical correlation recommended. Moderate hiatal hernia.      XR CHEST PORTABLE    Result Date: 9/9/2021  EXAMINATION: ONE XRAY VIEW OF THE CHEST 9/9/2021 3:16 pm COMPARISON: 12/05/2019 HISTORY: ORDERING SYSTEM PROVIDED HISTORY: fall on coumadin TECHNOLOGIST PROVIDED HISTORY: fall on coumadin Reason for Exam: fall, ankle pain. patient is on coumadin. Acuity: Acute Type of Exam: Initial FINDINGS: Patient is rotated. Stable ICD leads. Mild bibasal fibrosis. The cardiac size is mildly enlarged. No acute infiltrates or pleural effusions are seen. Pulmonary vascularity appears normal. There is mild ectasia of the thoracic aorta. There are degenerative changes in the spine . No acute bony abnormalities. The hilar structures are normal.     No acute cardiopulmonary disease       CONSULTS:  None    PROCEDURES:  None    FINAL IMPRESSION      1. Fall, initial encounter    2. Acute right ankle pain    3. Generalized weakness    4. Frequent falls         DISPOSITION/PLAN   DISPOSITION Decision To Admit 09/09/2021 09:13:53 PM       PATIENT REFERRED TO:   No follow-up provider specified.     DISCHARGE MEDICATIONS:     New Prescriptions    No medications on file         (Please note that portions of this note were completed with a voice recognition program.  Efforts were made to edit the dictations but occasionally words are mis-transcribed.)    Nate Jefferson MD  Attending Emergency Physician         Nate Jefferson MD  09/09/21 1346

## 2021-09-10 NOTE — DISCHARGE INSTR - COC
Continuity of Care Form    Patient Name: Blayne Griffiths   :  1934  MRN:  [id number]    Admit date:  2021  Discharge date:  2021    Code Status Order: Prior   Advance Directives:      Admitting Physician:  No admitting provider for patient encounter. PCP: Armanod Tse MD    Discharging Nurse: Prakash Love RN  [address] Unit/Room#: STA09/09  Discharging Unit Phone Number: [phone]    Emergency Contact:   Extended Emergency Contact Information  Primary Emergency Contact: Matthew Hernandez  Address: [address]           [address], [address] [place] [address]  Home Phone: [phone]  Relation: Spouse    Past Surgical History:  Past Surgical History:   Procedure Laterality Date    ABLATION OF DYSRHYTHMIC FOCUS      COLONOSCOPY      PACEMAKER PLACEMENT         Immunization History: There is no immunization history on file for this patient. Active Problems:  Patient Active Problem List   Diagnosis Code    Atrial fibrillation with RVR ([facility]) I48.91    CAD (coronary artery disease) I25.10    Hyperlipidemia E78.5    Hypertension I10    Noncompliance with medications Z91.14    Acute diastolic CHF (congestive heart failure) ([facility]) I50.31       Isolation/Infection:   Isolation            No Isolation          Patient Infection Status       None to display            Nurse Assessment:  Last Vital Signs: BP (!) 191/89   Pulse 105   Temp 98.2 °F (36.8 °C) (Oral)   Resp 18   Ht 5' 6\" (1.676 m)   Wt 174 lb (78.9 kg)   SpO2 96%   BMI 28.08 kg/m²     Last documented pain score (0-10 scale): Pain Level: 8  Last Weight:   Wt Readings from Last 1 Encounters:   21 174 lb (78.9 kg)     Mental Status:  oriented, alert, coherent and logical. Patient has hx of dementia.     IV Access:  - None    Nursing Mobility/ADLs:  Walking   Assisted  Transfer  Assisted  Bathing  Assisted  Dressing  Assisted  Toileting  Assisted  Feeding  Independent  Med Admin  Assisted  Med Delivery   whole    Wound Care Documentation and Therapy:        Elimination:  Continence:   · Bowel: No  · Bladder: No  Urinary Catheter: None   Colostomy/Ileostomy/Ileal Conduit: No       Date of Last BM: 09/12/2021  No intake or output data in the 24 hours ending 09/09/21 2115  No intake/output data recorded. Safety Concerns:     History of Falls (last 30 days)    Impairments/Disabilities:      Speech-Delayed responses at times. Hx of dementia. Nutrition Therapy:  Current Nutrition Therapy:   - Oral Diet:  General    Routes of Feeding: Oral  Liquids: No Restrictions  Daily Fluid Restriction: no  Last Modified Barium Swallow with Video (Video Swallowing Test): not done    Treatments at the Time of Hospital Discharge:   Respiratory Treatments: None  Oxygen Therapy:  is not on home oxygen therapy.   Ventilator:    - No ventilator support    Rehab Therapies: Physical Therapy and Occupational Therapy  Weight Bearing Status/Restrictions: No weight bearing restirctions  Other Medical Equipment (for information only, NOT a DME order):  walker  Other Treatments: ***    Patient's personal belongings (please select all that are sent with patient):  Dentures upper    RN SIGNATURE:  Electronically signed by Tim Gillespie RN on 9/13/2021 at 11:41 AM      CASE MANAGEMENT/SOCIAL WORK SECTION    Inpatient Status Date: ***    Readmission Risk Assessment Score:  Readmission Risk              Risk of Unplanned Readmission:  0           Discharging to Facility/ Agency    Name: You are [facility]   For 1 week of Physical Therapy, prior to going home:  [address] [facility] [address] [employer] Address: [address] [place] [address]   Phone:  [phone]  · Fax:  [phone]    / signature: Electronically signed by LUIS ALBERTO Menjivar on 9/10/21 at 10:25 AM EDT    PHYSICIAN SECTION    Prognosis: Fair    Condition at Discharge: Stable    Rehab Potential (if transferring to Rehab): Good    Recommended Labs or Other Treatments After Discharge: PT, OT, outpatient follow-up with orthopedic surgery for chronic back pain and moderate lumbar spinal stenosis    Physician Certification: I certify the above information and transfer of Fili Farias  is necessary for the continuing treatment of the diagnosis listed and that she requires [facility] for less 30 days.      Update Admission H&P: No change in H&P    PHYSICIAN SIGNATURE:  Electronically signed by Siddhartha Thomas MD on 9/10/21 at 1:39 PM EDT

## 2021-09-10 NOTE — PLAN OF CARE
Problem: Falls - Risk of:  Goal: Will remain free from falls  Description: Will remain free from falls  Outcome: Ongoing  Note: Siderails up x 2  Hourly rounding  Call light in reach  Instructed to call for assist before attempting out of bed. Remains free from falls and accidental injury at this time   Floor free from obstacles  Bed is locked and in lowest position  Adequate lighting provided  Bed alarm on, Red Falling star and Stay with Me signs posted  Goal: Absence of physical injury  Description: Absence of physical injury  Outcome: Ongoing     Problem: Skin Integrity:  Goal: Will show no infection signs and symptoms  Description: Will show no infection signs and symptoms  Outcome: Ongoing  Note: Checked for incontinence every 2 hours and prn. Pericare as needed. Assisted to reposition off back frequently. On waffle mattress. Heels off bed with pillows. Goal: Absence of new skin breakdown  Description: Absence of new skin breakdown  Outcome: Ongoing     Problem: IP BALANCE  Goal: LTG - patient will maintain standing balance to allow for completion of daily activities  9/10/2021 1819 by Anusha Young RN  Outcome: Ongoing  Problem: Pain:  Goal: Pain level will decrease  Description: Pain level will decrease  Outcome: Ongoing  Note: Pain level assessment complete. Patient educated on pain scale and control interventions  PRN pain medication given per patient request  Patient instructed to call out with new onset of pain or unrelieved pain  Patient denies pain at this time.   Goal: Control of acute pain  Description: Control of acute pain  Outcome: Ongoing  Goal: Control of chronic pain  Description: Control of chronic pain  Outcome: Ongoing

## 2021-09-11 LAB
ANION GAP SERPL CALCULATED.3IONS-SCNC: 9 MMOL/L (ref 9–17)
BUN BLDV-MCNC: 19 MG/DL (ref 8–23)
BUN/CREAT BLD: 25 (ref 9–20)
CALCIUM SERPL-MCNC: 9.6 MG/DL (ref 8.6–10.4)
CHLORIDE BLD-SCNC: 103 MMOL/L (ref 98–107)
CO2: 26 MMOL/L (ref 20–31)
CREAT SERPL-MCNC: 0.76 MG/DL (ref 0.5–0.9)
GFR AFRICAN AMERICAN: >60 ML/MIN
GFR NON-AFRICAN AMERICAN: >60 ML/MIN
GFR SERPL CREATININE-BSD FRML MDRD: ABNORMAL ML/MIN/{1.73_M2}
GFR SERPL CREATININE-BSD FRML MDRD: ABNORMAL ML/MIN/{1.73_M2}
GLUCOSE BLD-MCNC: 123 MG/DL (ref 70–99)
POTASSIUM SERPL-SCNC: 3.7 MMOL/L (ref 3.7–5.3)
SODIUM BLD-SCNC: 138 MMOL/L (ref 135–144)

## 2021-09-11 PROCEDURE — G0378 HOSPITAL OBSERVATION PER HR: HCPCS

## 2021-09-11 PROCEDURE — 36415 COLL VENOUS BLD VENIPUNCTURE: CPT

## 2021-09-11 PROCEDURE — 97530 THERAPEUTIC ACTIVITIES: CPT

## 2021-09-11 PROCEDURE — 99225 PR SBSQ OBSERVATION CARE/DAY 25 MINUTES: CPT | Performed by: FAMILY MEDICINE

## 2021-09-11 PROCEDURE — 80048 BASIC METABOLIC PNL TOTAL CA: CPT

## 2021-09-11 PROCEDURE — 97535 SELF CARE MNGMENT TRAINING: CPT

## 2021-09-11 PROCEDURE — 6370000000 HC RX 637 (ALT 250 FOR IP): Performed by: FAMILY MEDICINE

## 2021-09-11 PROCEDURE — 6370000000 HC RX 637 (ALT 250 FOR IP): Performed by: NURSE PRACTITIONER

## 2021-09-11 RX ADMIN — VENLAFAXINE HYDROCHLORIDE 75 MG: 75 TABLET ORAL at 08:02

## 2021-09-11 RX ADMIN — CEPHALEXIN 500 MG: 500 CAPSULE ORAL at 08:02

## 2021-09-11 RX ADMIN — TROSPIUM CHLORIDE 20 MG: 20 TABLET, FILM COATED ORAL at 08:02

## 2021-09-11 RX ADMIN — TROSPIUM CHLORIDE 20 MG: 20 TABLET, FILM COATED ORAL at 17:22

## 2021-09-11 RX ADMIN — VENLAFAXINE HYDROCHLORIDE 75 MG: 75 TABLET ORAL at 17:21

## 2021-09-11 RX ADMIN — ATORVASTATIN CALCIUM 20 MG: 20 TABLET, FILM COATED ORAL at 19:34

## 2021-09-11 RX ADMIN — CEPHALEXIN 500 MG: 500 CAPSULE ORAL at 19:34

## 2021-09-11 RX ADMIN — LISINOPRIL 20 MG: 10 TABLET ORAL at 08:02

## 2021-09-11 RX ADMIN — RIVAROXABAN 20 MG: 20 TABLET, FILM COATED ORAL at 17:21

## 2021-09-11 RX ADMIN — VERAPAMIL HYDROCHLORIDE 240 MG: 240 TABLET, FILM COATED, EXTENDED RELEASE ORAL at 19:34

## 2021-09-11 ASSESSMENT — PAIN DESCRIPTION - LOCATION: LOCATION: ANKLE;KNEE

## 2021-09-11 ASSESSMENT — PAIN SCALES - WONG BAKER
WONGBAKER_NUMERICALRESPONSE: 4
WONGBAKER_NUMERICALRESPONSE: 4

## 2021-09-11 ASSESSMENT — PAIN SCALES - GENERAL
PAINLEVEL_OUTOF10: 0

## 2021-09-11 ASSESSMENT — ENCOUNTER SYMPTOMS
NAUSEA: 0
ABDOMINAL PAIN: 0
RHINORRHEA: 0
VOMITING: 0
DIARRHEA: 0
CONSTIPATION: 0
WHEEZING: 0
SHORTNESS OF BREATH: 0
BLOOD IN STOOL: 0
CHEST TIGHTNESS: 0
COUGH: 0

## 2021-09-11 ASSESSMENT — PAIN DESCRIPTION - ORIENTATION: ORIENTATION: RIGHT;POSTERIOR

## 2021-09-11 NOTE — PROGRESS NOTES
Physical Therapy  Facility/Department: [facility] MED SURG  Daily Treatment Note  NAME: Son Weinstein  : 1934  MRN: [id number]    Date of Service: 2021    Discharge Recommendations:  Pt currently functioning below baseline. Would suggest additional therapy at time of discharge to maximize long term outcomes and prevent re-admission. Please refer to AM-PAC score for current level of function. Assessment   Body structures, Functions, Activity limitations: Decreased functional mobility ; Decreased ROM; Decreased strength;Decreased safe awareness;Decreased endurance;Decreased balance;Decreased posture;Decreased cognition  Assessment: Per RN, does not want pt. in chair as she has been trying to crawl OOB. After Tx, had pt. return to supine with bed alarm. Pt. is much improved in terms of strength and balance with functional mobility, only requiring min A x 1. Rt ankle continues to be an issue for mobility. Pt. is confused with telesitter and bed alarm. Prognosis: Good  PT Education: Goals;PT Role;Plan of Care;Transfer Training;General Safety;Gait Training;Functional Mobility Training  Patient Education: Pt ed on importance of being up & OOB, UE/ LE AROM on own in room. Pt. with good demo but will need continued review. REQUIRES PT FOLLOW UP: Yes  Activity Tolerance  Activity Tolerance: Patient limited by pain  Activity Tolerance: Pt unable to bear weight through R ankle     Patient Diagnosis(es): The primary encounter diagnosis was Fall, initial encounter. Diagnoses of Acute right ankle pain, Generalized weakness, and Frequent falls were also pertinent to this visit. has a past medical history of Atrial fibrillation ([facility] [address] CAD (coronary artery disease), Dementia ([facility] [address] Hyperlipidemia, Hypertension, and S/P cardiac cath. has a past surgical history that includes pacemaker placement; Colonoscopy; and ablation of dysrhythmic focus.     Restrictions  Restrictions/Precautions  Restrictions/Precautions: General Precautions, Fall Risk  Implants present? : Pacemaker  Position Activity Restriction  Other position/activity restrictions: Up with assist, alarms, RUE IV, telesitter  Subjective   General  Chart Reviewed: Yes  Additional Pertinent Hx: Dementia, afib, HTN, hyperlipidemia, CAD  Family / Caregiver Present: No  Subjective  Subjective: Pt. c/o pain primarily in post. knee (hamstring / upper calf region) and also Rt. ankle. Orientation     Cognition      Objective   Bed mobility  Rolling to Right: Minimal assistance  Supine to Sit: Minimal assistance  Sit to Supine: Minimal assistance  Comment: use of bedrail and mod VC to get fully seated and squared up at bedside  Transfers  Sit to Stand: Minimal Assistance  Stand to sit: Minimal Assistance  Comment: First stood with SCAR STEDY. Pt. stands with wt. through LLE as Rt. ankle and post. knee hurt. Able to achieve tall posture, however pt. prefers leaning on bar of SCAR. Places Rt. foot fwd. so as to not need to bear wt. through it. Stood for one min. Rested on seat of [facility], then sat on bed, min A. Pt. then stood with RW in same fashion with wt. through LLE and min A.  Pt. avoids WB into Rt. foot. Had pt. attempt to take steps along EOB with RW and pt. chooses to pivot L foot to avoid wt. through Teryl [place]. Min A. to move one foot. Ambulation  Ambulation?: No  Ambulation 1  Comments: Pt unable to bear weight through RLE & not appropriate for safe amb this session.      Balance  Posture: Good  Sitting - Static: Good  Sitting - Dynamic: Good;-  Standing - Static: Fair                           G-Code     OutComes Score                                                     AM-PAC Score  AM-PAC Inpatient Mobility Raw Score : 13 (09/11/21 0951)  AM-PAC Inpatient T-Scale Score : 36.74 (09/11/21 0951)  Mobility Inpatient CMS 0-100% Score: 64.91 (09/11/21 0951)  Mobility Inpatient CMS G-Code Modifier : CL (09/11/21 9774)          Goals  Short term goals  Time Frame for Short term goals: 12 visits  Short term goal 1: Increase bed mobility to mod I to enable pt to safely get in/OOB. Short term goal 2: Pt able to transfer from bed to chair with mod assist using safest device. Short term goal 3: Pt able to tolerate standing & bearing weight through RLE with mod assist for 10 min or >. Short term goal 4: Increase strength to 4-/5 & standing balance to fair with RW to facilitate pt independence for performance of ADL's, functional mobility & reduce fall risk. Short term goal 5: Initiate gait & pt able to amb 50ft or > indep w/ RW to enable pt to return to previous level of independence    Plan    Plan  Times per week: 1-2x/d, 5-6d/wk  Current Treatment Recommendations: Strengthening, ROM, Balance Training, Functional Mobility Training, Transfer Training, Cognitive/Perceptual Training, Endurance Training, Gait Training, Neuromuscular Re-education, Safety Education & Training, Patient/Caregiver Education & Training  Safety Devices  Type of devices:  All fall risk precautions in place, Gait belt, Bed alarm in place, Patient at risk for falls, Left in bed, Nurse notified     Therapy Time   Individual Concurrent Group Co-treatment   Time In 0930         Time Out 0953         Minutes 23                 Kirby ELISE Session, PT

## 2021-09-11 NOTE — CARE COORDINATION
Telesitter removed from room today at 7am.  Continue to follow progress and awaiting precert for [employer].

## 2021-09-11 NOTE — PROGRESS NOTES
[facility]  Office: [address], DO, Prudencioarmando Kaur, DO, Tanja Sal, DO, Roberto Vazquez Blood, DO, Shanna Jerome MD, Mckenna Liao MD, Jael Dumont MD, Kamar Borja MD, Keiko Keller MD, Jean-Pierre Knowles MD, Guero Rayo MD, Estrada Hood, DO, Lindsey Anton, DO, Lucía Nunez MD,  Francisco Santiago, DO, Clif Nguyen MD, Milind Maciel MD, Yazmin Yates MD, Suhas Samuel MD, , Lizbeth Canas MD, Alfred Carl MD, Laura Gale MD, Franck Arenas, [facility], [facility], [facility], Luis Weiner, CNP, Lanie Shields, CNS, Don Wright, CNP, Brisa Sweeney, CNP, Arin Wolf, CNP, Inez Khan, CNP, Earnest Dillon, CNP, Mili Alexis PA-C, Joselito Abbasi, [facility], Neisha Mahan, CNP, Lieutenant Davila, CNP, Julieta Young, CNP, Rachel Taylor, CNP, Anupam Remy, CNP, Gavin Talbert, CNP, Yusuf Grimm, [facility], [facility]      Daily Progress Note     Admit Date: 9/9/2021  Bed/Room No.  2009/2009-02  Admitting Physician : Jael Dumont MD  Code Status :Full Code  Hospital Day:  LOS: 0 days   Chief Complaint:     Chief Complaint   Patient presents with    Ankle Pain     Rt ankle pain; no deformity noted     Principal Problem:    Repeated falls  Active Problems:    Spinal stenosis of lumbar region    Chronic atrial fibrillation [facility])    Dyslipidemia    Essential hypertension    Fall    Declining functional status  Resolved Problems:    Acute cystitis without hematuria    Subjective : Interval History/Significant events :  09/11/21    Patient confused last night and was trying to get out of the bed. She was moved into a different room and Telesitter was placed for safety. She is alert and oriented this morning. Patient has no acute complaints for me today. She is breathing on room air. She denies any headache, nausea, vomiting. Vitals - Stable afebrile  Labs -hypokalemia 3.1, no new labs. Nursing notes , Consults notes reviewed.  Overnight events and updates discussed with Nursing staff . Background History:         Shiva Sewell is 80 y.o. female  Who was admitted to the hospital on 9/9/2021 for treatment of Repeated falls. Presented to the hospital with fall at home 2 days ago. Patient was complaining of hip pain and ankle pain on the right side. She was complaining of overall generalized weakness. Evaluation emergency room showed normal white count, normal electrolytes, kidney function. UA was positive for trace ketones, many bacteria, negative nitrite, leukoesterase. Chest x-ray was negative for acute cardiopulmonary disease, x-ray left ankle showed swelling without any fracture or dislocation, CT head was negative for acute abnormality and showed mild central and cortical cerebral atrophy and deep white matter changes, x-ray cervical spine showed multilevel cervical spondylosis grade 1 spondylolisthesis of C4 with respect to C3, CT thoracic and lumbar spine showed multilevel spondylosis with moderate central canal stenosis at L3-L4. CT abdomen pelvis showed small foci of gas within bladder and minimal adjacent stranding findings concerning for instrumentation versus cystitis, moderate hiatal hernia. Patient was admitted for acute cystitis and further evaluation of her gait due to her repeated falls at home.       PMH:  Past Medical History:   Diagnosis Date    Atrial fibrillation ([address]     CAD (coronary artery disease)     Dementia (HCC)     Hyperlipidemia     Hypertension     S/P cardiac cath       Allergies: No Known Allergies   Medications :  venlafaxine, 75 mg, Oral, TID  cephALEXin, 500 mg, Oral, 2 times per day  atorvastatin, 20 mg, Oral, Nightly  lisinopril, 20 mg, Oral, Daily  trospium, 20 mg, Oral, BID AC  rivaroxaban, 20 mg, Oral, Daily  verapamil, 240 mg, Oral, Nightly  sodium chloride flush, 5-40 mL, IntraVENous, 2 times per day        Review of Systems   Review of Systems   Constitutional: Negative for appetite change, fatigue, fever and unexpected weight change. HENT: Negative for congestion, rhinorrhea and sneezing. Eyes: Negative for visual disturbance. Respiratory: Negative for cough, chest tightness, shortness of breath and wheezing. Cardiovascular: Negative for chest pain and palpitations. Gastrointestinal: Negative for abdominal pain, blood in stool, constipation, diarrhea, nausea and vomiting. Genitourinary: Negative for dysuria, enuresis, frequency and hematuria. Musculoskeletal: Negative for arthralgias and myalgias. Right ankle pain   Skin: Negative for rash. Neurological: Negative for dizziness, weakness, light-headedness and headaches. Hematological: Does not bruise/bleed easily. Psychiatric/Behavioral: Negative for dysphoric mood and sleep disturbance. Objective :      Current Vitals : Temp: 100.4 °F (38 °C),  Pulse: 63, Resp: 18, BP: 103/77, SpO2: 92 %  Last 24 Hrs Vitals   Patient Vitals for the past 24 hrs:   BP Temp Temp src Pulse Resp SpO2 Weight   09/11/21 0814 103/77 100.4 °F (38 °C) Oral 63 18 92 % --   09/11/21 0418 -- -- -- -- -- -- 165 lb 9.6 oz (75.1 kg)   09/10/21 1935 (!) 127/56 97.7 °F (36.5 °C) Oral 73 16 96 % --   09/10/21 1502 118/66 98.1 °F (36.7 °C) Oral 72 18 94 % --   09/10/21 1114 (!) 99/50 98.2 °F (36.8 °C) Oral 71 18 90 % --     Intake / output   09/10 0701 - 09/11 0700  In: -   Out: 200 [Urine:200]  Physical Exam:  Physical Exam  Vitals and nursing note reviewed. Constitutional:       General: She is not in acute distress. Appearance: She is not diaphoretic. HENT:      Head: Normocephalic and atraumatic. Nose:      Right Sinus: No maxillary sinus tenderness or frontal sinus tenderness. Left Sinus: No maxillary sinus tenderness or frontal sinus tenderness. Mouth/Throat:      Pharynx: No oropharyngeal exudate. Eyes:      General: No scleral icterus.      Conjunctiva/sclera: Conjunctivae normal.      Pupils: Pupils are equal, round, and reactive to light. Neck:      Thyroid: No thyromegaly. Vascular: No JVD. Cardiovascular:      Rate and Rhythm: Normal rate and regular rhythm. Pulses:           Dorsalis pedis pulses are 2+ on the right side and 2+ on the left side. Heart sounds: Normal heart sounds. No murmur heard. Pulmonary:      Effort: Pulmonary effort is normal.      Breath sounds: Normal breath sounds. No wheezing or rales. Abdominal:      Palpations: Abdomen is soft. There is no mass. Tenderness: There is no abdominal tenderness. Musculoskeletal:      Cervical back: Full passive range of motion without pain and neck supple. Right foot: Decreased range of motion (due to pain at ankle ). Lymphadenopathy:      Head:      Right side of head: No submandibular adenopathy. Left side of head: No submandibular adenopathy. Cervical: No cervical adenopathy. Skin:     General: Skin is warm. Neurological:      Mental Status: She is alert and oriented to person, place, and time. Motor: No tremor. Psychiatric:         Behavior: Behavior is cooperative. Laboratory findings:    Recent Labs     09/09/21  1804 09/10/21  0454   WBC 8.3 8.4   HGB 13.2 11.9   HCT 40.7 36.9    142   INR 1.5 2.5     Recent Labs     09/09/21  1804 09/10/21  0454    140   K 3.7 3.1*    104   CO2 25 28   GLUCOSE 103* 121*   BUN 13 9   CREATININE 0.58 0.53   MG  --  1.7   CALCIUM 9.8 9.1     No results for input(s): PROT, LABALBU, LABA1C, C1ZPPPC, K5RBNBJ, FT4, TSH, AST, ALT, LDH, GGT, ALKPHOS, BILITOT, BILIDIR, AMMONIA, AMYLASE, LIPASE, LACTATE, CHOL, HDL, LDLCHOLESTEROL, CHOLHDLRATIO, TRIG, VLDL, BNP, TROPONINI, CKTOTAL, CKMB, CKMBINDEX, RF, DON in the last 72 hours.        Specific Gravity, UA   Date Value Ref Range Status   09/09/2021 1.015 1.005 - 1.030 Final     Protein, UA   Date Value Ref Range Status   09/09/2021 NEGATIVE NEGATIVE Final     RBC, UA   Date Value Ref Range Status   09/09/2021 0 TO 2 0 spine demonstrates decreasedmineralization with  cervical lordosis. There is no evidence of acute fracture . Old fracture of the spinous process of C6. Grade 1 spondylolisthesis of C 4 with respect to C3. Priya Sharper There is loss of disc height with eburnation of the vertebral endplates at the O7-7, Y0-9, C5-6, C6-7 and C7-T1 levels. There are anterior and posterior marginal osteophytes at multiple levels. The central canal is grossly patent. There is bilateral facet hypertrophy at multiple levels throughout the cervical spine. The pedicles and posterior elements are otherwise intact. The prevertebral and paravertebral soft tissues are unremarkable. The atlanto-dens interval and dens are intact. The visualized lung apices are clear. Vascular calcifications are seen compatible with atherosclerotic disease. IMPRESSION: Multilevel cervical spondylosis and degenerative disc disease. Grade 1 spondylolisthesis of C 4 with respect to C3 which appears chronic RECOMMENDATIONS: Further evaluation should be obtained with MR imaging if clinically indicated. CT THORACIC SPINE WO CONTRAST    Result Date: 9/9/2021  No acute bony abnormalities are noted. Multilevel lumbar spondylosis, facet arthropathy and degenerative disc disease as described above. Moderate central canal and lateral recess spinal stenosis at L3-4. Right foraminal stenosis at L2-3 and L3-4. RECOMMENDATIONS: Further evaluation may be obtained with MR imaging if clinically indicated. CT CERVICAL SPINE FINDINGS: The cervical spine demonstrates decreasedmineralization with  cervical lordosis. There is no evidence of acute fracture . Old fracture of the spinous process of C6. Grade 1 spondylolisthesis of C 4 with respect to C3. Priya Sharper There is loss of disc height with eburnation of the vertebral endplates at the J8-4, Y0-8, C5-6, C6-7 and C7-T1 levels. There are anterior and posterior marginal osteophytes at multiple levels. The central canal is grossly patent.   There is bilateral facet hypertrophy at multiple levels throughout the cervical spine. The pedicles and posterior elements are otherwise intact. The prevertebral and paravertebral soft tissues are unremarkable. The atlanto-dens interval and dens are intact. The visualized lung apices are clear. Vascular calcifications are seen compatible with atherosclerotic disease. IMPRESSION: Multilevel cervical spondylosis and degenerative disc disease. Grade 1 spondylolisthesis of C 4 with respect to C3 which appears chronic RECOMMENDATIONS: Further evaluation should be obtained with MR imaging if clinically indicated. CT LUMBAR SPINE WO CONTRAST    Result Date: 9/9/2021  No acute bony abnormalities are noted. Multilevel lumbar spondylosis, facet arthropathy and degenerative disc disease as described above. Moderate central canal and lateral recess spinal stenosis at L3-4. Right foraminal stenosis at L2-3 and L3-4. RECOMMENDATIONS: Further evaluation may be obtained with MR imaging if clinically indicated. CT CERVICAL SPINE FINDINGS: The cervical spine demonstrates decreasedmineralization with  cervical lordosis. There is no evidence of acute fracture . Old fracture of the spinous process of C6. Grade 1 spondylolisthesis of C 4 with respect to C3. Sofia Salen There is loss of disc height with eburnation of the vertebral endplates at the Z4-6, H2-6, C5-6, C6-7 and C7-T1 levels. There are anterior and posterior marginal osteophytes at multiple levels. The central canal is grossly patent. There is bilateral facet hypertrophy at multiple levels throughout the cervical spine. The pedicles and posterior elements are otherwise intact. The prevertebral and paravertebral soft tissues are unremarkable. The atlanto-dens interval and dens are intact. The visualized lung apices are clear. Vascular calcifications are seen compatible with atherosclerotic disease. IMPRESSION: Multilevel cervical spondylosis and degenerative disc disease.  Grade 1 spondylolisthesis of C 4 with respect to C3 which appears chronic RECOMMENDATIONS: Further evaluation should be obtained with MR imaging if clinically indicated. CT ABDOMEN PELVIS W IV CONTRAST Additional Contrast? None    Result Date: 9/9/2021  There is a small foci of gas within the bladder and minimal adjacent stranding. Findings concerning for cystitis in the absence of recent instrumentation. Clinical correlation recommended. Mild interstitial thickening in the visualized lung bases may be related to mild pulmonary edema. Clinical correlation recommended. Moderate hiatal hernia. XR CHEST PORTABLE    Result Date: 9/9/2021  No acute cardiopulmonary disease        Clinical Course : stable  Assessment and Plan  :        1. Acute cystitis-oral Keflex -received Rocephin in emergency room. 2. Repeated falls due to debility secondary to age and lumbar degenerative disease with moderate spinal stenosis -PT OT no acute surgical intervention required. Follow outpatient with orthopedic surgery. 3. Chronic atrial fibrillation -on Xarelto. 4. essential hypertension -stable, discontinue hydrochlorothiazide-continue lisinopril 20 mg daily. 5. Mild dementia -  6. hypokalemia-check potassium        Patient is stable for discharge to skilled nursing facility for rehab. Plan and updates discussed with patient ,  answers  explained to satisfaction.    Plan discussed with Staff [place] RN     (Please note that portions of this note were completed with a voice recognition program. Efforts were made to edit the dictations but occasionally words are mis-transcribed.)      Radha Hutchinson MD  9/11/2021

## 2021-09-11 NOTE — PROGRESS NOTES
Patient is very confused. She keeps trying to get out of bed. She took off her tele and and her ID band. We are going to move her closer to the nursing station.  rm 9

## 2021-09-12 LAB
ANION GAP SERPL CALCULATED.3IONS-SCNC: 9 MMOL/L (ref 9–17)
BUN BLDV-MCNC: 14 MG/DL (ref 8–23)
BUN/CREAT BLD: 25 (ref 9–20)
CALCIUM SERPL-MCNC: 9.5 MG/DL (ref 8.6–10.4)
CHLORIDE BLD-SCNC: 105 MMOL/L (ref 98–107)
CO2: 26 MMOL/L (ref 20–31)
CREAT SERPL-MCNC: 0.57 MG/DL (ref 0.5–0.9)
GFR AFRICAN AMERICAN: >60 ML/MIN
GFR NON-AFRICAN AMERICAN: >60 ML/MIN
GFR SERPL CREATININE-BSD FRML MDRD: ABNORMAL ML/MIN/{1.73_M2}
GFR SERPL CREATININE-BSD FRML MDRD: ABNORMAL ML/MIN/{1.73_M2}
GLUCOSE BLD-MCNC: 119 MG/DL (ref 70–99)
HCT VFR BLD CALC: 39.6 % (ref 36.3–47.1)
HEMOGLOBIN: 12.8 G/DL (ref 11.9–15.1)
INR BLD: 2.3
MCH RBC QN AUTO: 31.2 PG (ref 25.2–33.5)
MCHC RBC AUTO-ENTMCNC: 32.3 G/DL (ref 28.4–34.8)
MCV RBC AUTO: 96.6 FL (ref 82.6–102.9)
NRBC AUTOMATED: 0 PER 100 WBC
PDW BLD-RTO: 12.5 % (ref 11.8–14.4)
PLATELET # BLD: 145 K/UL (ref 138–453)
PMV BLD AUTO: 11.6 FL (ref 8.1–13.5)
POTASSIUM SERPL-SCNC: 4.1 MMOL/L (ref 3.7–5.3)
PROTHROMBIN TIME: 24.3 SEC (ref 11.5–14.2)
RBC # BLD: 4.1 M/UL (ref 3.95–5.11)
SODIUM BLD-SCNC: 140 MMOL/L (ref 135–144)
WBC # BLD: 9.2 K/UL (ref 3.5–11.3)

## 2021-09-12 PROCEDURE — 99225 PR SBSQ OBSERVATION CARE/DAY 25 MINUTES: CPT | Performed by: FAMILY MEDICINE

## 2021-09-12 PROCEDURE — 36415 COLL VENOUS BLD VENIPUNCTURE: CPT

## 2021-09-12 PROCEDURE — 85610 PROTHROMBIN TIME: CPT

## 2021-09-12 PROCEDURE — 6370000000 HC RX 637 (ALT 250 FOR IP): Performed by: NURSE PRACTITIONER

## 2021-09-12 PROCEDURE — 85027 COMPLETE CBC AUTOMATED: CPT

## 2021-09-12 PROCEDURE — G0378 HOSPITAL OBSERVATION PER HR: HCPCS

## 2021-09-12 PROCEDURE — 80048 BASIC METABOLIC PNL TOTAL CA: CPT

## 2021-09-12 PROCEDURE — 6370000000 HC RX 637 (ALT 250 FOR IP): Performed by: FAMILY MEDICINE

## 2021-09-12 RX ADMIN — VENLAFAXINE HYDROCHLORIDE 75 MG: 75 TABLET ORAL at 19:56

## 2021-09-12 RX ADMIN — TROSPIUM CHLORIDE 20 MG: 20 TABLET, FILM COATED ORAL at 17:52

## 2021-09-12 RX ADMIN — LISINOPRIL 20 MG: 10 TABLET ORAL at 08:13

## 2021-09-12 RX ADMIN — VENLAFAXINE HYDROCHLORIDE 75 MG: 75 TABLET ORAL at 13:59

## 2021-09-12 RX ADMIN — VENLAFAXINE HYDROCHLORIDE 75 MG: 75 TABLET ORAL at 01:45

## 2021-09-12 RX ADMIN — ACETAMINOPHEN 650 MG: 325 TABLET ORAL at 19:57

## 2021-09-12 RX ADMIN — ATORVASTATIN CALCIUM 20 MG: 20 TABLET, FILM COATED ORAL at 19:57

## 2021-09-12 RX ADMIN — CEPHALEXIN 500 MG: 500 CAPSULE ORAL at 08:13

## 2021-09-12 RX ADMIN — ACETAMINOPHEN 650 MG: 325 TABLET ORAL at 13:59

## 2021-09-12 RX ADMIN — RIVAROXABAN 20 MG: 20 TABLET, FILM COATED ORAL at 17:52

## 2021-09-12 RX ADMIN — VENLAFAXINE HYDROCHLORIDE 75 MG: 75 TABLET ORAL at 08:13

## 2021-09-12 RX ADMIN — ACETAMINOPHEN 650 MG: 325 TABLET ORAL at 01:45

## 2021-09-12 RX ADMIN — TROSPIUM CHLORIDE 20 MG: 20 TABLET, FILM COATED ORAL at 05:44

## 2021-09-12 RX ADMIN — CEPHALEXIN 500 MG: 500 CAPSULE ORAL at 19:57

## 2021-09-12 ASSESSMENT — PAIN SCALES - GENERAL
PAINLEVEL_OUTOF10: 0
PAINLEVEL_OUTOF10: 0
PAINLEVEL_OUTOF10: 4
PAINLEVEL_OUTOF10: 4
PAINLEVEL_OUTOF10: 0
PAINLEVEL_OUTOF10: 0
PAINLEVEL_OUTOF10: 5

## 2021-09-12 ASSESSMENT — PAIN DESCRIPTION - LOCATION
LOCATION: ANKLE;KNEE
LOCATION: ANKLE;KNEE

## 2021-09-12 ASSESSMENT — PAIN DESCRIPTION - ORIENTATION
ORIENTATION: RIGHT;POSTERIOR
ORIENTATION: RIGHT;POSTERIOR

## 2021-09-12 ASSESSMENT — ENCOUNTER SYMPTOMS
CHEST TIGHTNESS: 0
ABDOMINAL PAIN: 0
CONSTIPATION: 0
RHINORRHEA: 0
COUGH: 0
VOMITING: 0
NAUSEA: 0
BLOOD IN STOOL: 0
WHEEZING: 0
DIARRHEA: 0
SHORTNESS OF BREATH: 0

## 2021-09-12 ASSESSMENT — PAIN DESCRIPTION - ONSET
ONSET: GRADUAL
ONSET: GRADUAL

## 2021-09-12 ASSESSMENT — PAIN DESCRIPTION - DESCRIPTORS
DESCRIPTORS: ACHING
DESCRIPTORS: ACHING

## 2021-09-12 ASSESSMENT — PAIN DESCRIPTION - FREQUENCY
FREQUENCY: INTERMITTENT
FREQUENCY: INTERMITTENT

## 2021-09-12 ASSESSMENT — PAIN - FUNCTIONAL ASSESSMENT
PAIN_FUNCTIONAL_ASSESSMENT: ACTIVITIES ARE NOT PREVENTED
PAIN_FUNCTIONAL_ASSESSMENT: ACTIVITIES ARE NOT PREVENTED

## 2021-09-12 ASSESSMENT — PAIN DESCRIPTION - PROGRESSION
CLINICAL_PROGRESSION: GRADUALLY IMPROVING
CLINICAL_PROGRESSION: GRADUALLY IMPROVING

## 2021-09-12 NOTE — PROGRESS NOTES
[facility]  Office: [address], DO, Oneida Gold, DO, Thiago Neff, DO, Mareugenemarbin Red Blood, DO, Leonidas Sanabria MD, Carolin Rand MD, Itzel Ramirez MD, Lucía Harper MD, Ora English MD, Aniya Kern MD, Asia Mead MD, Scott Healy, DO, Kota Estrada, DO, Melissa Ribeiro MD,  Marcos Jennings, DO, Ld Mendez MD, Fiorella Murcia MD, Edward Brizuela MD, Black Mcintosh MD, , Grisel Sebastian MD, Sammy Torrez MD, Paco Dillon MD, Jefffrank Nava, [facility], [facility], [facility], Prashanth Reddy, CNP, Blanca William, CNS, Cruz Dobson, CNP, Claudia Price, CNP, Guy Barger, CNP, Micah Chavez, CNP, Mir Barahona, CNP, Shayan Medina PA-C, Vianca Amaya, [facility], Kwadwo Chavarria, CNP, Oscar Barney, CNP, Janina Tang, [facility], Freedom Hubbard, CNP, Kathryn Goetz, CNP, Rachelle Nichols, CNP, Kaylan Hoyt, CNP, [facility]      Daily Progress Note     Admit Date: 9/9/2021  Bed/Room No.  2009/2009-02  Admitting Physician : Itzel Ramirez MD  Code Status :Full Code  Hospital Day:  LOS: 0 days   Chief Complaint:     Chief Complaint   Patient presents with    Ankle Pain     Rt ankle pain; no deformity noted     Principal Problem:    Repeated falls  Active Problems:    Spinal stenosis of lumbar region    Chronic atrial fibrillation [facility])    Dyslipidemia    Essential hypertension    Fall    Declining functional status  Resolved Problems:    Acute cystitis without hematuria    Subjective : Interval History/Significant events :  09/12/21    Patient remains alert, oriented. She has no acute complaints for me. She is eating and drinking well. Patient is on oral antibiotics. Vitals - Stable afebrile  Labs -potassium 4.1, normal WBC. .    Nursing notes , Consults notes reviewed. Overnight events and updates discussed with Nursing staff .    Background History:         Saritha Mandujano is 80 y.o. female  Who was admitted to the hospital on 9/9/2021 for treatment of Repeated falls. Presented to the hospital with fall at home 2 days ago. Patient was complaining of hip pain and ankle pain on the right side. She was complaining of overall generalized weakness. Evaluation emergency room showed normal white count, normal electrolytes, kidney function. UA was positive for trace ketones, many bacteria, negative nitrite, leukoesterase. Chest x-ray was negative for acute cardiopulmonary disease, x-ray left ankle showed swelling without any fracture or dislocation, CT head was negative for acute abnormality and showed mild central and cortical cerebral atrophy and deep white matter changes, x-ray cervical spine showed multilevel cervical spondylosis grade 1 spondylolisthesis of C4 with respect to C3, CT thoracic and lumbar spine showed multilevel spondylosis with moderate central canal stenosis at L3-L4. CT abdomen pelvis showed small foci of gas within bladder and minimal adjacent stranding findings concerning for instrumentation versus cystitis, moderate hiatal hernia. Patient was admitted for acute cystitis and further evaluation of her gait due to her repeated falls at home. PMH:  Past Medical History:   Diagnosis Date    Atrial fibrillation ([address]     CAD (coronary artery disease)     Dementia (HCC)     Hyperlipidemia     Hypertension     S/P cardiac cath       Allergies: No Known Allergies   Medications :  venlafaxine, 75 mg, Oral, TID  cephALEXin, 500 mg, Oral, 2 times per day  atorvastatin, 20 mg, Oral, Nightly  lisinopril, 20 mg, Oral, Daily  trospium, 20 mg, Oral, BID AC  rivaroxaban, 20 mg, Oral, Daily  verapamil, 240 mg, Oral, Nightly  sodium chloride flush, 5-40 mL, IntraVENous, 2 times per day        Review of Systems   Review of Systems   Constitutional: Negative for appetite change, fatigue, fever and unexpected weight change. HENT: Negative for congestion, rhinorrhea and sneezing. Eyes: Negative for visual disturbance.    Respiratory: Negative for cough, chest tightness, shortness of breath and wheezing. Cardiovascular: Negative for chest pain and palpitations. Gastrointestinal: Negative for abdominal pain, blood in stool, constipation, diarrhea, nausea and vomiting. Genitourinary: Negative for dysuria, enuresis, frequency and hematuria. Musculoskeletal: Negative for arthralgias and myalgias. Right ankle pain   Skin: Negative for rash. Neurological: Negative for dizziness, weakness, light-headedness and headaches. Hematological: Does not bruise/bleed easily. Psychiatric/Behavioral: Negative for dysphoric mood and sleep disturbance. Objective :      Current Vitals : Temp: 97.6 °F (36.4 °C),  Pulse: 61, Resp: 18, BP: (!) 119/56, SpO2: 92 %  Last 24 Hrs Vitals   Patient Vitals for the past 24 hrs:   BP Temp Temp src Pulse Resp SpO2 Weight   09/12/21 0415 -- -- -- -- -- -- 170 lb (77.1 kg)   09/11/21 1930 (!) 119/56 97.6 °F (36.4 °C) Oral 61 18 -- --   09/11/21 0814 103/77 100.4 °F (38 °C) Oral 63 18 92 % --     Intake / output   09/11 0701 - 09/12 0700  In: 180 [P.O.:180]  Out: 200 [Urine:200]  Physical Exam:  Physical Exam  Vitals and nursing note reviewed. Constitutional:       General: She is not in acute distress. Appearance: She is not diaphoretic. HENT:      Head: Normocephalic and atraumatic. Nose:      Right Sinus: No maxillary sinus tenderness or frontal sinus tenderness. Left Sinus: No maxillary sinus tenderness or frontal sinus tenderness. Mouth/Throat:      Pharynx: No oropharyngeal exudate. Eyes:      General: No scleral icterus. Conjunctiva/sclera: Conjunctivae normal.      Pupils: Pupils are equal, round, and reactive to light. Neck:      Thyroid: No thyromegaly. Vascular: No JVD. Cardiovascular:      Rate and Rhythm: Normal rate and regular rhythm. Pulses:           Dorsalis pedis pulses are 2+ on the right side and 2+ on the left side. Heart sounds: Normal heart sounds.  No murmur heard. Pulmonary:      Effort: Pulmonary effort is normal.      Breath sounds: Normal breath sounds. No wheezing or rales. Abdominal:      Palpations: Abdomen is soft. There is no mass. Tenderness: There is no abdominal tenderness. Musculoskeletal:      Cervical back: Full passive range of motion without pain and neck supple. Right foot: Decreased range of motion (due to pain at ankle ). Lymphadenopathy:      Head:      Right side of head: No submandibular adenopathy. Left side of head: No submandibular adenopathy. Cervical: No cervical adenopathy. Skin:     General: Skin is warm. Neurological:      Mental Status: She is alert and oriented to person, place, and time. Motor: No tremor. Psychiatric:         Behavior: Behavior is cooperative. Laboratory findings:    Recent Labs     09/09/21  1804 09/10/21  0454   WBC 8.3 8.4   HGB 13.2 11.9   HCT 40.7 36.9    142   INR 1.5 2.5     Recent Labs     09/09/21  1804 09/10/21  0454 09/11/21  1359    140 138   K 3.7 3.1* 3.7    104 103   CO2 25 28 26   GLUCOSE 103* 121* 123*   BUN 13 9 19   CREATININE 0.58 0.53 0.76   MG  --  1.7  --    CALCIUM 9.8 9.1 9.6     No results for input(s): PROT, LABALBU, LABA1C, R8ZKNYW, M8FSNHF, FT4, TSH, AST, ALT, LDH, GGT, ALKPHOS, BILITOT, BILIDIR, AMMONIA, AMYLASE, LIPASE, LACTATE, CHOL, HDL, LDLCHOLESTEROL, CHOLHDLRATIO, TRIG, VLDL, BNP, TROPONINI, CKTOTAL, CKMB, CKMBINDEX, RF, DON in the last 72 hours.        Specific Gravity, UA   Date Value Ref Range Status   09/09/2021 1.015 1.005 - 1.030 Final     Protein, UA   Date Value Ref Range Status   09/09/2021 NEGATIVE NEGATIVE Final     RBC, UA   Date Value Ref Range Status   09/09/2021 0 TO 2 0 - 2 /HPF Final     Bacteria, UA   Date Value Ref Range Status   09/09/2021 MANY (A) None Final     Nitrite, Urine   Date Value Ref Range Status   09/09/2021 NEGATIVE NEGATIVE Final     WBC, UA   Date Value Ref Range Status disc height with eburnation of the vertebral endplates at the B8-8, Q1-6, C5-6, C6-7 and C7-T1 levels. There are anterior and posterior marginal osteophytes at multiple levels. The central canal is grossly patent. There is bilateral facet hypertrophy at multiple levels throughout the cervical spine. The pedicles and posterior elements are otherwise intact. The prevertebral and paravertebral soft tissues are unremarkable. The atlanto-dens interval and dens are intact. The visualized lung apices are clear. Vascular calcifications are seen compatible with atherosclerotic disease. IMPRESSION: Multilevel cervical spondylosis and degenerative disc disease. Grade 1 spondylolisthesis of C 4 with respect to C3 which appears chronic RECOMMENDATIONS: Further evaluation should be obtained with MR imaging if clinically indicated. CT THORACIC SPINE WO CONTRAST    Result Date: 9/9/2021  No acute bony abnormalities are noted. Multilevel lumbar spondylosis, facet arthropathy and degenerative disc disease as described above. Moderate central canal and lateral recess spinal stenosis at L3-4. Right foraminal stenosis at L2-3 and L3-4. RECOMMENDATIONS: Further evaluation may be obtained with MR imaging if clinically indicated. CT CERVICAL SPINE FINDINGS: The cervical spine demonstrates decreasedmineralization with  cervical lordosis. There is no evidence of acute fracture . Old fracture of the spinous process of C6. Grade 1 spondylolisthesis of C 4 with respect to C3. Priya Sharper There is loss of disc height with eburnation of the vertebral endplates at the I1-9, I6-7, C5-6, C6-7 and C7-T1 levels. There are anterior and posterior marginal osteophytes at multiple levels. The central canal is grossly patent. There is bilateral facet hypertrophy at multiple levels throughout the cervical spine. The pedicles and posterior elements are otherwise intact. The prevertebral and paravertebral soft tissues are unremarkable.  The atlanto-dens interval and dens are intact. The visualized lung apices are clear. Vascular calcifications are seen compatible with atherosclerotic disease. IMPRESSION: Multilevel cervical spondylosis and degenerative disc disease. Grade 1 spondylolisthesis of C 4 with respect to C3 which appears chronic RECOMMENDATIONS: Further evaluation should be obtained with MR imaging if clinically indicated. CT LUMBAR SPINE WO CONTRAST    Result Date: 9/9/2021  No acute bony abnormalities are noted. Multilevel lumbar spondylosis, facet arthropathy and degenerative disc disease as described above. Moderate central canal and lateral recess spinal stenosis at L3-4. Right foraminal stenosis at L2-3 and L3-4. RECOMMENDATIONS: Further evaluation may be obtained with MR imaging if clinically indicated. CT CERVICAL SPINE FINDINGS: The cervical spine demonstrates decreasedmineralization with  cervical lordosis. There is no evidence of acute fracture . Old fracture of the spinous process of C6. Grade 1 spondylolisthesis of C 4 with respect to C3. Benedetta Ou There is loss of disc height with eburnation of the vertebral endplates at the A2-6, X0-7, C5-6, C6-7 and C7-T1 levels. There are anterior and posterior marginal osteophytes at multiple levels. The central canal is grossly patent. There is bilateral facet hypertrophy at multiple levels throughout the cervical spine. The pedicles and posterior elements are otherwise intact. The prevertebral and paravertebral soft tissues are unremarkable. The atlanto-dens interval and dens are intact. The visualized lung apices are clear. Vascular calcifications are seen compatible with atherosclerotic disease. IMPRESSION: Multilevel cervical spondylosis and degenerative disc disease. Grade 1 spondylolisthesis of C 4 with respect to C3 which appears chronic RECOMMENDATIONS: Further evaluation should be obtained with MR imaging if clinically indicated.      CT ABDOMEN PELVIS W IV CONTRAST Additional Contrast? None    Result Date: 9/9/2021  There is a small foci of gas within the bladder and minimal adjacent stranding. Findings concerning for cystitis in the absence of recent instrumentation. Clinical correlation recommended. Mild interstitial thickening in the visualized lung bases may be related to mild pulmonary edema. Clinical correlation recommended. Moderate hiatal hernia. XR CHEST PORTABLE    Result Date: 9/9/2021  No acute cardiopulmonary disease        Clinical Course : stable  Assessment and Plan  :        1. Acute cystitis-oral Keflex -received Rocephin in emergency room. 2. Repeated falls due to debility secondary to age and lumbar degenerative disease with moderate spinal stenosis -PT OT no acute surgical intervention required. Follow outpatient with orthopedic surgery. 3. Chronic atrial fibrillation -on Xarelto. 4. essential hypertension -stable, discontinue hydrochlorothiazide-continue lisinopril 20 mg daily. 5. Mild dementia -  6. hypokalemia-check potassium        Patient is stable for discharge to skilled nursing facility for rehab. Awaiting precertification. Plan and updates discussed with patient ,  answers  explained to satisfaction.    Plan discussed with Staff Eamon Landaverde RN     (Please note that portions of this note were completed with a voice recognition program. Efforts were made to edit the dictations but occasionally words are mis-transcribed.)      Alberto Salinas MD  9/12/2021

## 2021-09-13 VITALS
WEIGHT: 170 LBS | HEIGHT: 66 IN | BODY MASS INDEX: 27.32 KG/M2 | SYSTOLIC BLOOD PRESSURE: 134 MMHG | DIASTOLIC BLOOD PRESSURE: 75 MMHG | RESPIRATION RATE: 18 BRPM | HEART RATE: 116 BPM | TEMPERATURE: 97.8 F | OXYGEN SATURATION: 95 %

## 2021-09-13 PROCEDURE — 99217 PR OBSERVATION CARE DISCHARGE MANAGEMENT: CPT | Performed by: FAMILY MEDICINE

## 2021-09-13 PROCEDURE — 6370000000 HC RX 637 (ALT 250 FOR IP): Performed by: FAMILY MEDICINE

## 2021-09-13 PROCEDURE — 97112 NEUROMUSCULAR REEDUCATION: CPT

## 2021-09-13 PROCEDURE — G0378 HOSPITAL OBSERVATION PER HR: HCPCS

## 2021-09-13 PROCEDURE — 6370000000 HC RX 637 (ALT 250 FOR IP): Performed by: NURSE PRACTITIONER

## 2021-09-13 PROCEDURE — 97530 THERAPEUTIC ACTIVITIES: CPT

## 2021-09-13 PROCEDURE — 97535 SELF CARE MNGMENT TRAINING: CPT

## 2021-09-13 RX ADMIN — CEPHALEXIN 500 MG: 500 CAPSULE ORAL at 08:39

## 2021-09-13 RX ADMIN — VENLAFAXINE HYDROCHLORIDE 75 MG: 75 TABLET ORAL at 08:39

## 2021-09-13 RX ADMIN — LISINOPRIL 20 MG: 10 TABLET ORAL at 08:39

## 2021-09-13 RX ADMIN — ACETAMINOPHEN 650 MG: 325 TABLET ORAL at 10:57

## 2021-09-13 RX ADMIN — TROSPIUM CHLORIDE 20 MG: 20 TABLET, FILM COATED ORAL at 06:14

## 2021-09-13 ASSESSMENT — ENCOUNTER SYMPTOMS
WHEEZING: 0
CHEST TIGHTNESS: 0
ABDOMINAL PAIN: 0
BLOOD IN STOOL: 0
VOMITING: 0
RHINORRHEA: 0
CONSTIPATION: 0
SHORTNESS OF BREATH: 0
NAUSEA: 0
DIARRHEA: 0
COUGH: 0

## 2021-09-13 ASSESSMENT — PAIN DESCRIPTION - ONSET: ONSET: SUDDEN

## 2021-09-13 ASSESSMENT — PAIN SCALES - GENERAL
PAINLEVEL_OUTOF10: 0
PAINLEVEL_OUTOF10: 9
PAINLEVEL_OUTOF10: 0

## 2021-09-13 ASSESSMENT — PAIN DESCRIPTION - PROGRESSION
CLINICAL_PROGRESSION: GRADUALLY WORSENING
CLINICAL_PROGRESSION: RESOLVED

## 2021-09-13 ASSESSMENT — PAIN - FUNCTIONAL ASSESSMENT: PAIN_FUNCTIONAL_ASSESSMENT: ACTIVITIES ARE NOT PREVENTED

## 2021-09-13 ASSESSMENT — PAIN DESCRIPTION - LOCATION: LOCATION: HEAD

## 2021-09-13 ASSESSMENT — PAIN DESCRIPTION - FREQUENCY: FREQUENCY: INTERMITTENT

## 2021-09-13 ASSESSMENT — PAIN SCALES - WONG BAKER: WONGBAKER_NUMERICALRESPONSE: 0

## 2021-09-13 ASSESSMENT — PAIN DESCRIPTION - DESCRIPTORS: DESCRIPTORS: HEADACHE

## 2021-09-13 NOTE — PROGRESS NOTES
Writer called report to Melina at [facility].    Electronically signed by Sanjuana Richter RN on 9/13/2021 at 11:49 AM

## 2021-09-13 NOTE — PROGRESS NOTES
Pt discharged to [facility] with all belongings. Pt left unit and hospital grounds via stretcher assisted by [employer] transportation.    Electronically signed by Tay Vaca RN on 9/13/2021 at 1:11 PM

## 2021-09-13 NOTE — PLAN OF CARE
Problem: Falls - Risk of:  Goal: Will remain free from falls  Description: Will remain free from falls  9/13/2021 1004 by Dickson Redd RN  Outcome: Ongoing  9/12/2021 2248 by Reinaldo Minor RN  Outcome: Ongoing  Goal: Absence of physical injury  Description: Absence of physical injury  9/13/2021 1004 by Dickson Redd RN  Outcome: Ongoing  9/12/2021 2248 by Reinaldo Minor RN  Outcome: Ongoing     Problem: Skin Integrity:  Goal: Will show no infection signs and symptoms  Description: Will show no infection signs and symptoms  9/13/2021 1004 by Dickson Redd RN  Outcome: Ongoing  9/12/2021 2248 by Reinaldo Minor RN  Outcome: Ongoing  Goal: Absence of new skin breakdown  Description: Absence of new skin breakdown  9/13/2021 1004 by Dickson Redd RN  Outcome: Ongoing  9/12/2021 2248 by Reinaldo Minor RN  Outcome: Ongoing     Problem: IP BALANCE  Goal: LTG - patient will maintain standing balance to allow for completion of daily activities  Outcome: Ongoing     Problem: Pain:  Goal: Pain level will decrease  Description: Pain level will decrease  Outcome: Ongoing  Goal: Control of acute pain  Description: Control of acute pain  Outcome: Ongoing  Goal: Control of chronic pain  Description: Control of chronic pain  Outcome: Ongoing

## 2021-09-13 NOTE — PROGRESS NOTES
Physical Therapy  Facility/Department: [facility] MED SURG  Daily Treatment Note  NAME: Joseph Tomas  : 1934  MRN: [id number]    Date of Service: 2021    Discharge Recommendations:  Patient would benefit from continued therapy after discharge        Assessment   Body structures, Functions, Activity limitations: Decreased functional mobility ; Decreased ROM; Decreased strength;Decreased safe awareness;Decreased endurance;Decreased balance;Decreased posture;Decreased cognition  Assessment: Patient is progressing toward her goals of returning to PLOF but is limited by her Rt LE pain causing decreased functional mobility. Prognosis: Good  PT Education: Goals;PT Role;Plan of Care;Transfer Training;General Safety;Gait Training;Functional Mobility Training  Patient Education: Pt ed on importance of being up & OOB, UE/ LE AROM on own in room. Pt. with good demo but will need continued review. REQUIRES PT FOLLOW UP: Yes  Activity Tolerance  Activity Tolerance: Patient limited by pain  Activity Tolerance: Pt unable to bear weight through R ankle     Patient Diagnosis(es): The primary encounter diagnosis was Fall, initial encounter. Diagnoses of Acute right ankle pain, Generalized weakness, and Frequent falls were also pertinent to this visit. has a past medical history of Atrial fibrillation ([facility] [address] CAD (coronary artery disease), Dementia ([facility] [address] Hyperlipidemia, Hypertension, and S/P cardiac cath. has a past surgical history that includes pacemaker placement; Colonoscopy; and ablation of dysrhythmic focus. Restrictions  Restrictions/Precautions  Restrictions/Precautions: General Precautions, Fall Risk  Implants present? : Pacemaker  Position Activity Restriction  Other position/activity restrictions: Up with assist, alarms, RUE IV  Subjective   General  Chart Reviewed:  Yes  Additional Pertinent Hx: Dementia, afib, HTN, hyperlipidemia, CAD  Response To Previous Treatment: Patient with no complaints from previous session. Family / Caregiver Present: No  Subjective  Subjective: Pt. c/o pain primarily in post. knee (hamstring / upper calf region) and also Rt. ankle. General Comment  Comments: RN okays PT treatment. Orientation  Orientation  Overall Orientation Status: Within Functional Limits  Cognition   Cognition  Overall Cognitive Status: Exceptions  Arousal/Alertness: Delayed responses to stimuli  Following Commands: Follows one step commands with repetition; Follows one step commands with increased time  Attention Span: Attends with cues to redirect  Memory: Decreased short term memory;Decreased recall of recent events  Safety Judgement: Decreased awareness of need for assistance;Decreased awareness of need for safety  Problem Solving: Assistance required to implement solutions;Assistance required to correct errors made;Assistance required to generate solutions;Assistance required to identify errors made;Decreased awareness of errors  Insights: Not aware of deficits  Initiation: Requires cues for some  Sequencing: Requires cues for all  Objective   Bed mobility  Rolling to Left: Moderate assistance;2 Person assistance  Rolling to Right: Moderate assistance;2 Person assistance  Supine to Sit: Moderate assistance;2 Person assistance  Sit to Supine: Moderate assistance;2 Person assistance  Scooting: Moderate assistance;2 Person assistance  Comment: VCs for tech, step by step directions with assistance, 2 A for all bed mob, hand placement, correct use of bed railing, HOB elevated. Transfers  Sit to Stand: Moderate Assistance;2 Person Assistance  Stand to sit: Moderate Assistance;2 Person Assistance  Bed to Chair: Dependent/Total  Stand Pivot Transfers: Dependent/Total  Lateral Transfers: Dependent/Total  Comment: STS attempt with RW x 2 reps with heavy posterior lean and difficulty WBing into Rt LE due to pain. Noticed patient bedding, gown and brief was soilded in urine.  Washed up at EOB, brief and gown changed, STS within Soumya max Mod x 2 A with VCs for hand/ foot placement, postural control to achieve upright posture. Stood 30 sec x 2 reps for dependent Pericare. Transferred to toilet with Soumya max. Mod x 2A STS and dependent for pericare/brief mgmt. Ambulation  Ambulation?: No  More Ambulation?: No  Neuromuscular Education  NDT Treatment: Sitting;Standing  Neuromuscular Comments: .neuro  Balance  Posture: Good  Sitting - Static: Good  Sitting - Dynamic: Good  Standing - Static: Fair  Standing - Dynamic: Poor  Comments: within Margarette Ferrera    AM-PAC Score  AM-PAC Inpatient Mobility Raw Score : 10 (09/13/21 1900)  AM-PAC Inpatient T-Scale Score : 32.29 (09/13/21 1900)  Mobility Inpatient CMS 0-100% Score: 76.75 (09/13/21 1900)  Mobility Inpatient CMS G-Code Modifier : CL (09/13/21 1900)          Goals  Short term goals  Time Frame for Short term goals: 12 visits  Short term goal 1: Increase bed mobility to mod I to enable pt to safely get in/OOB. Short term goal 2: Pt able to transfer from bed to chair with mod assist using safest device. Short term goal 3: Pt able to tolerate standing & bearing weight through RLE with mod assist for 10 min or >. Short term goal 4: Increase strength to 4-/5 & standing balance to fair with RW to facilitate pt independence for performance of ADL's, functional mobility & reduce fall risk. Short term goal 5: Initiate gait & pt able to amb 50ft or > indep w/ RW to enable pt to return to previous level of independence    Plan    Plan  Times per week: 1-2x/d, 5-6d/wk  Current Treatment Recommendations: Strengthening, ROM, Balance Training, Functional Mobility Training, Transfer Training, Cognitive/Perceptual Training, Endurance Training, Gait Training, Neuromuscular Re-education, Safety Education & Training, Patient/Caregiver Education & Training  Safety Devices  Type of devices:  All fall risk precautions in place, Gait belt, Patient at risk for falls, Nurse notified, Left in chair, Chair alarm in place, Call light within reach  Restraints  Initially in place: No     Therapy Time   Individual Concurrent Group Co-treatment   Time In 1041        Time Out 1119        [place] , [place]

## 2021-09-13 NOTE — PROGRESS NOTES
[facility]  Office: [address], DO, Juan Koreyse, DO, Alan Eller, DO, Percy Choudhury, DO, Jason Meyers MD, Anant Mchugh MD, Michael Young MD, Esdras Rivas MD, Jaqueline Warren MD, Nelida Martin MD, Leena Beavers MD, Neena Ramsey, DO, Popeye Vazquez, DO, Loretta Meeks MD,  Tony Leahy DO, Luis Fernando Mendosa MD, Daria Daly MD, Opal Mcdonnell MD, Manuel Anderson MD, , Genet Arciniega MD, To Nelson MD, Zahida Farley MD, Cole Dean, CNP, [facility], CNP, Garrett Hitchcock, CNP, Naman Ly, CNS, Courtney Rubio, CNP, Shreya Sanford, CNP, Isis Santiago, CNP, Jeremy Dupree, CNP, Jax Castillo, CNP, Corbin Everett PA-C, Katie Olivares, [facility], Deonte Garcia, CNP, Usha Marshall, CNP, Becca Ahmadi, CNP, Jeannette Rose, CNP, Ludwig Barrientos, CNP, Mariia Barrios, CNP, Radha Patiño, CNP, Charmaine MonsalveCox [facility]nut Lawn      Daily Progress Note     Admit Date: 9/9/2021  Bed/Room No.  2009/2009-02  Admitting Physician : No att. providers found  Code Status :Prior  Hospital Day:  LOS: 0 days   Chief Complaint:     Chief Complaint   Patient presents with    Ankle Pain     Rt ankle pain; no deformity noted     Principal Problem:    Repeated falls  Active Problems:    Spinal stenosis of lumbar region    Chronic atrial fibrillation [facility])    Dyslipidemia    Essential hypertension    Fall    Declining functional status  Resolved Problems:    Acute cystitis without hematuria    Subjective : Interval History/Significant events :  09/13/21    Patient has no acute complaints. No delirium overnight , she is eating and drinking well. Breathing on room air. She denies any cough , expectoration. .  Vitals - Stable afebrile  Labs -potassium 4.1, normal WBC. .    Nursing notes , Consults notes reviewed. Overnight events and updates discussed with Nursing staff .    Background History:         Froilan Arceo is 80 y.o. female  Who was admitted to the hospital on 9/9/2021 for treatment of Repeated falls. Presented to the hospital with fall at home 2 days ago. Patient was complaining of hip pain and ankle pain on the right side. She was complaining of overall generalized weakness. Evaluation emergency room showed normal white count, normal electrolytes, kidney function. UA was positive for trace ketones, many bacteria, negative nitrite, leukoesterase. Chest x-ray was negative for acute cardiopulmonary disease, x-ray left ankle showed swelling without any fracture or dislocation, CT head was negative for acute abnormality and showed mild central and cortical cerebral atrophy and deep white matter changes, x-ray cervical spine showed multilevel cervical spondylosis grade 1 spondylolisthesis of C4 with respect to C3, CT thoracic and lumbar spine showed multilevel spondylosis with moderate central canal stenosis at L3-L4. CT abdomen pelvis showed small foci of gas within bladder and minimal adjacent stranding findings concerning for instrumentation versus cystitis, moderate hiatal hernia. Patient was admitted for acute cystitis and further evaluation of her gait due to her repeated falls at home. PMH:  Past Medical History:   Diagnosis Date    Atrial fibrillation ([address]     CAD (coronary artery disease)     Dementia (HCC)     Hyperlipidemia     Hypertension     S/P cardiac cath       Allergies: No Known Allergies   Medications :      Review of Systems   Review of Systems   Constitutional: Negative for appetite change, fatigue, fever and unexpected weight change. HENT: Negative for congestion, rhinorrhea and sneezing. Eyes: Negative for visual disturbance. Respiratory: Negative for cough, chest tightness, shortness of breath and wheezing. Cardiovascular: Negative for chest pain and palpitations. Gastrointestinal: Negative for abdominal pain, blood in stool, constipation, diarrhea, nausea and vomiting.    Genitourinary: Negative for dysuria, enuresis, frequency and hematuria. Musculoskeletal: Negative for arthralgias and myalgias. Right ankle pain   Skin: Negative for rash. Neurological: Negative for dizziness, weakness, light-headedness and headaches. Hematological: Does not bruise/bleed easily. Psychiatric/Behavioral: Negative for dysphoric mood and sleep disturbance. Objective :      Current Vitals : Temp: 97.8 °F (36.6 °C),  Pulse: 116, Resp: 18, BP: 134/75, SpO2: 95 %  Last 24 Hrs Vitals   Patient Vitals for the past 24 hrs:   BP Temp Temp src Pulse Resp SpO2   09/13/21 0748 134/75 97.8 °F (36.6 °C) Oral 116 18 95 %   09/12/21 1844 (!) 102/53 98.4 °F (36.9 °C) Oral 69 16 95 %     Intake / output   09/12 1501 - 09/13 1500  In: 1000 [P.O.:1000]  Out: -   Physical Exam:  Physical Exam  Vitals and nursing note reviewed. Constitutional:       General: She is not in acute distress. Appearance: She is not diaphoretic. HENT:      Head: Normocephalic and atraumatic. Nose:      Right Sinus: No maxillary sinus tenderness or frontal sinus tenderness. Left Sinus: No maxillary sinus tenderness or frontal sinus tenderness. Mouth/Throat:      Pharynx: No oropharyngeal exudate. Eyes:      General: No scleral icterus. Conjunctiva/sclera: Conjunctivae normal.      Pupils: Pupils are equal, round, and reactive to light. Neck:      Thyroid: No thyromegaly. Vascular: No JVD. Cardiovascular:      Rate and Rhythm: Normal rate and regular rhythm. Pulses:           Dorsalis pedis pulses are 2+ on the right side and 2+ on the left side. Heart sounds: Normal heart sounds. No murmur heard. Pulmonary:      Effort: Pulmonary effort is normal.      Breath sounds: Normal breath sounds. No wheezing or rales. Abdominal:      Palpations: Abdomen is soft. There is no mass. Tenderness: There is no abdominal tenderness. Musculoskeletal:      Cervical back: Full passive range of motion without pain and neck supple.       Right foot: Decreased range of motion (due to pain at ankle ). Lymphadenopathy:      Head:      Right side of head: No submandibular adenopathy. Left side of head: No submandibular adenopathy. Cervical: No cervical adenopathy. Skin:     General: Skin is warm. Neurological:      Mental Status: She is alert and oriented to person, place, and time. Motor: No tremor. Psychiatric:         Behavior: Behavior is cooperative. Laboratory findings:    Recent Labs     09/12/21  0835   WBC 9.2   HGB 12.8   HCT 39.6      INR 2.3     Recent Labs     09/11/21  1359 09/12/21  0835    140   K 3.7 4.1    105   CO2 26 26   GLUCOSE 123* 119*   BUN 19 14   CREATININE 0.76 0.57   CALCIUM 9.6 9.5     No results for input(s): PROT, LABALBU, LABA1C, X2DIVVU, H0MQFOU, FT4, TSH, AST, ALT, LDH, GGT, ALKPHOS, BILITOT, BILIDIR, AMMONIA, AMYLASE, LIPASE, LACTATE, CHOL, HDL, LDLCHOLESTEROL, CHOLHDLRATIO, TRIG, VLDL, BNP, TROPONINI, CKTOTAL, CKMB, CKMBINDEX, RF, DON in the last 72 hours. Specific Gravity, UA   Date Value Ref Range Status   09/09/2021 1.015 1.005 - 1.030 Final     Protein, UA   Date Value Ref Range Status   09/09/2021 NEGATIVE NEGATIVE Final     RBC, UA   Date Value Ref Range Status   09/09/2021 0 TO 2 0 - 2 /HPF Final     Bacteria, UA   Date Value Ref Range Status   09/09/2021 MANY (A) None Final     Nitrite, Urine   Date Value Ref Range Status   09/09/2021 NEGATIVE NEGATIVE Final     WBC, UA   Date Value Ref Range Status   09/09/2021 0 TO 2 0 - 5 /HPF Final     Leukocyte Esterase, Urine   Date Value Ref Range Status   09/09/2021 NEGATIVE NEGATIVE Final       Imaging / Clinical Data :-   XR ANKLE LEFT (MIN 3 VIEWS)    Result Date: 9/9/2021  Soft tissue swelling is identified at the ankle, though no fracture or dislocation is identified. CT Head WO Contrast    Result Date: 9/9/2021  Mild central and cortical cerebral atrophy.  Mild chronic deep white matter ischemic changes No acute intracranial abnormalities are noted. CT CHEST WO CONTRAST    Result Date: 9/9/2021  1. Solid and subsolid noncalcified pulmonary nodules measuring up to 5 mm. These could be infectious or inflammatory, but short interval follow-up is recommended. 2. Cardiomegaly. Coronary artery atherosclerosis. RECOMMENDATIONS: Multiple pulmonary nodules. Most severe: 5 mm left ground-glass pulmonary nodule within the upper lobe. Recommend a non-contrast Chest CT at 3-6 months. If stable, consider follow-up non-contrast Chest CTs at 2 and 4 years. These guidelines do not apply to immunocompromised patients and patients with cancer. Follow up in patients with significant comorbidities as clinically warranted. For lung cancer screening, adhere to Lung-RADS guidelines. Reference: Radiology. 2017; 284(1):228-43. CT Cervical Spine WO Contrast    Result Date: 9/9/2021  No acute bony abnormalities are noted. Multilevel lumbar spondylosis, facet arthropathy and degenerative disc disease as described above. Moderate central canal and lateral recess spinal stenosis at L3-4. Right foraminal stenosis at L2-3 and L3-4. RECOMMENDATIONS: Further evaluation may be obtained with MR imaging if clinically indicated. CT CERVICAL SPINE FINDINGS: The cervical spine demonstrates decreasedmineralization with  cervical lordosis. There is no evidence of acute fracture . Old fracture of the spinous process of C6. Grade 1 spondylolisthesis of C 4 with respect to C3. Henrique Rathke There is loss of disc height with eburnation of the vertebral endplates at the G7-6, Y0-2, C5-6, C6-7 and C7-T1 levels. There are anterior and posterior marginal osteophytes at multiple levels. The central canal is grossly patent. There is bilateral facet hypertrophy at multiple levels throughout the cervical spine. The pedicles and posterior elements are otherwise intact. The prevertebral and paravertebral soft tissues are unremarkable.  The atlanto-dens interval and dens are intact. The visualized lung apices are clear. Vascular calcifications are seen compatible with atherosclerotic disease. IMPRESSION: Multilevel cervical spondylosis and degenerative disc disease. Grade 1 spondylolisthesis of C 4 with respect to C3 which appears chronic RECOMMENDATIONS: Further evaluation should be obtained with MR imaging if clinically indicated. CT THORACIC SPINE WO CONTRAST    Result Date: 9/9/2021  No acute bony abnormalities are noted. Multilevel lumbar spondylosis, facet arthropathy and degenerative disc disease as described above. Moderate central canal and lateral recess spinal stenosis at L3-4. Right foraminal stenosis at L2-3 and L3-4. RECOMMENDATIONS: Further evaluation may be obtained with MR imaging if clinically indicated. CT CERVICAL SPINE FINDINGS: The cervical spine demonstrates decreasedmineralization with  cervical lordosis. There is no evidence of acute fracture . Old fracture of the spinous process of C6. Grade 1 spondylolisthesis of C 4 with respect to C3. Crystal Brittany There is loss of disc height with eburnation of the vertebral endplates at the V6-4, S9-6, C5-6, C6-7 and C7-T1 levels. There are anterior and posterior marginal osteophytes at multiple levels. The central canal is grossly patent. There is bilateral facet hypertrophy at multiple levels throughout the cervical spine. The pedicles and posterior elements are otherwise intact. The prevertebral and paravertebral soft tissues are unremarkable. The atlanto-dens interval and dens are intact. The visualized lung apices are clear. Vascular calcifications are seen compatible with atherosclerotic disease. IMPRESSION: Multilevel cervical spondylosis and degenerative disc disease. Grade 1 spondylolisthesis of C 4 with respect to C3 which appears chronic RECOMMENDATIONS: Further evaluation should be obtained with MR imaging if clinically indicated.      CT LUMBAR SPINE WO CONTRAST    Result Date: 9/9/2021  No acute bony abnormalities are noted. Multilevel lumbar spondylosis, facet arthropathy and degenerative disc disease as described above. Moderate central canal and lateral recess spinal stenosis at L3-4. Right foraminal stenosis at L2-3 and L3-4. RECOMMENDATIONS: Further evaluation may be obtained with MR imaging if clinically indicated. CT CERVICAL SPINE FINDINGS: The cervical spine demonstrates decreasedmineralization with  cervical lordosis. There is no evidence of acute fracture . Old fracture of the spinous process of C6. Grade 1 spondylolisthesis of C 4 with respect to C3. Clenton Reas There is loss of disc height with eburnation of the vertebral endplates at the H2-4, W7-6, C5-6, C6-7 and C7-T1 levels. There are anterior and posterior marginal osteophytes at multiple levels. The central canal is grossly patent. There is bilateral facet hypertrophy at multiple levels throughout the cervical spine. The pedicles and posterior elements are otherwise intact. The prevertebral and paravertebral soft tissues are unremarkable. The atlanto-dens interval and dens are intact. The visualized lung apices are clear. Vascular calcifications are seen compatible with atherosclerotic disease. IMPRESSION: Multilevel cervical spondylosis and degenerative disc disease. Grade 1 spondylolisthesis of C 4 with respect to C3 which appears chronic RECOMMENDATIONS: Further evaluation should be obtained with MR imaging if clinically indicated. CT ABDOMEN PELVIS W IV CONTRAST Additional Contrast? None    Result Date: 9/9/2021  There is a small foci of gas within the bladder and minimal adjacent stranding. Findings concerning for cystitis in the absence of recent instrumentation. Clinical correlation recommended. Mild interstitial thickening in the visualized lung bases may be related to mild pulmonary edema. Clinical correlation recommended. Moderate hiatal hernia.      XR CHEST PORTABLE    Result Date: 9/9/2021  No acute cardiopulmonary disease Clinical Course : stable  Assessment and Plan  :        1. Acute cystitis-oral Keflex -received Rocephin in emergency room. 2. Repeated falls due to debility secondary to age and lumbar degenerative disease with moderate spinal stenosis -PT OT no acute surgical intervention required. Follow outpatient with orthopedic surgery. 3. Chronic atrial fibrillation -on Xarelto. 4. essential hypertension -stable, discontinue hydrochlorothiazide-continue lisinopril 20 mg daily. 5. Mild dementia -  6. hypokalemia-check potassium        D/C to SNF       Plan and updates discussed with patient ,  answers  explained to satisfaction.    Plan discussed with Staff David Mir RN     (Please note that portions of this note were completed with a voice recognition program. Efforts were made to edit the dictations but occasionally words are mis-transcribed.)      Nain Stauffer MD  9/13/2021

## 2021-09-13 NOTE — PROGRESS NOTES
Occupational Therapy  Facility/Department: [facility] MED SURG  Daily Treatment Note  NAME: Tyesha Weathers  : 1934  MRN: [id number]    Date of Service: 2021    Discharge Recommendations:  Patient would benefit from continued therapy after discharge      Pt currently functioning below baseline. Would suggest additional therapy at time of discharge to maximize long term outcomes and prevent re-admission. Please refer to AM-PAC score for current level of function. Assessment   Performance deficits / Impairments: Decreased functional mobility ; Decreased safe awareness;Decreased balance;Decreased coordination;Decreased ADL status; Decreased cognition;Decreased vision/visual deficit; Decreased posture;Decreased endurance;Decreased high-level IADLs;Decreased strength;Decreased fine motor control  Assessment: At this time, pt is a high fall risk and requires max assist of 2 in harvey stedy lift for balance support/safety. Skilled OT is indicated to maximize functional I and safety as able to return home with assist as needed. Prognosis: Fair  REQUIRES OT FOLLOW UP: Yes  Activity Tolerance  Activity Tolerance: Patient Tolerated treatment well;Treatment limited secondary to decreased cognition  Activity Tolerance: P+  Safety Devices  Safety Devices in place: Yes  Type of devices: All fall risk precautions in place; Left in chair;Call light within reach;Nurse notified; Chair alarm in place;Gait belt;Patient at risk for falls         Patient Diagnosis(es): The primary encounter diagnosis was Fall, initial encounter. Diagnoses of Acute right ankle pain, Generalized weakness, and Frequent falls were also pertinent to this visit. has a past medical history of Atrial fibrillation ([facility] [address] CAD (coronary artery disease), Dementia ([facility] Hyperlipidemia, Hypertension, and S/P cardiac cath.    has a past surgical history that includes pacemaker placement; Colonoscopy; and ablation of dysrhythmic focus. Restrictions  Restrictions/Precautions  Restrictions/Precautions: General Precautions, Fall Risk  Implants present? : Pacemaker  Position Activity Restriction  Other position/activity restrictions: Up with assist, alarms, RUE IV  Subjective   General  Chart Reviewed: Yes  Patient assessed for rehabilitation services?: Yes  Response to previous treatment: Patient with no complaints from previous session  Family / Caregiver Present: No  Subjective  Subjective: Pt in bed upon arrival. Pt agreeable to therapy. Pt asked \"How is my  doing? \" and did c/o headache, RN aware. General Comment  Comments: RN ok'd pt for therapy. Orientation  Orientation  Overall Orientation Status: Within Functional Limits  Objective    ADL  Grooming: Setup;Minimal assistance;Verbal cueing (to comb hair and brush teeth seated EOB)  UE Bathing: Setup;Minimal assistance;Verbal cueing (assist to wash back seated EOB)  LE Bathing: Setup;Dependent/Total;Verbal cueing (standing in harvey stedy with 2 staff assist for balance)  UE Dressing: Setup;Verbal cueing;Minimal assistance (to change gowns)  LE Dressing: Setup;Dependent/Total;Verbal cueing (x2 staff assist in harvey stedy)  Toileting: Dependent/Total (x2 staff assist in harvey stedy)  Additional Comments: *Pt is DEP with 2 staff assist when up with [address] [place], [place] [address]dy for safety/balance. Balance  Sitting Balance: Stand by assistance (on EOB)  Standing Balance: Moderate assistance (x2 in harvey stedy)  Standing Balance  Time: less than 1 min  Activity: standing in harvey stedy for LB care  Functional Mobility  Functional Mobility Comments: Attempted use of RW but pt was unable to safely stand with walker, harvey stedy used with 2 staff assist.  Toilet Transfers  Equipment Used: Standard toilet  Toilet Transfer: Moderate assistance;2 Person assistance  Toilet Transfers Comments: Curlie Ball used with 2 staff assist. Verbal cues for hand/foot placement and overall safety.   Bed mobility  Supine to Sit: Minimal assistance  Scooting: Minimal assistance  Comment: HOB up with MOD verbal/tactile cues for sequencing movements, use of bed rail and overall safety. Transfers  Sit to stand: Moderate assistance;2 Person assistance  Stand to sit: Moderate assistance;2 Person assistance  Transfer Comments: With harvey steady requires prompts for hand and foot position                        Cognition  Overall Cognitive Status: Exceptions  Arousal/Alertness: Appropriate responses to stimuli  Following Commands: Follows one step commands with repetition; Follows one step commands with increased time  Attention Span: Appears intact  Memory: Decreased short term memory;Decreased recall of recent events  Safety Judgement: Decreased awareness of need for assistance;Decreased awareness of need for safety  Problem Solving: Assistance required to implement solutions;Assistance required to correct errors made;Assistance required to generate solutions;Assistance required to identify errors made;Decreased awareness of errors  Insights: Decreased awareness of deficits  Initiation: Requires cues for some  Sequencing: Requires cues for some     Perception  Overall Perceptual Status: Impaired  Initiation: Cues to initiate tasks                                   Plan   Plan  Times per week: 4-5x/week 1x/day as moreno  Current Treatment Recommendations: Strengthening, Balance Training, Functional Mobility Training, Safety Education & Training, Positioning, Home Management Training, Cognitive/Perceptual Training, Pain Management, Equipment Evaluation, Education, & procurement, Self-Care / ADL, Endurance Training, Neuromuscular Re-education, Patient/Caregiver Education & Training                                                  AM-PAC Score        AM-[facility] Inpatient Daily Activity Raw Score: 14 (09/13/21 1242)  AM-PAC Inpatient ADL T-Scale Score : 33.39 (09/13/21 1242)  ADL Inpatient CMS 0-100% Score: 59.67 (09/13/21 1242)  ADL Inpatient CMS G-Code Modifier : CK (09/13/21 1242)    Goals  Short term goals  Time Frame for Short term goals: by discharge, pt to demo  Short term goal 1: bed mob tasks with use of rail as needed to SBA. Short term goal 2: increase BUE strength by a 1/2 grade to assist with ADL's. Short term goal 3: UB ADL to set up and LB ADL to max assist of 1 and use of AD/bed rail as needed. Short term goal 4: ADL transfers with use of lift/AD as needed to max assist of 1(add functional mob goal as appropriate)  Short term goal 5: toileting with use of BSC/lift and AD as needed to max assist of 1. Long term goals  Long term goal 1: Pt to stand with min assist and AD moreno > 5 mins as able to reduce falls in function. Long term goal 2: Caregivers to be I with pressure relief, BUE HEP, fall prevention, EC/WS tech and DME/AE and AD recommendations with use of handouts. Patient Goals   Patient goals : After 3 choices given pt stated to be able to walk better! Therapy Time   Individual Concurrent Group Co-treatment   Time In       1003   Time Out       36   Minutes       45        Co-treatment with PT warranted secondary to decreased safety and independence requiring 2 skilled therapy professionals to address individual discipline's goals. OT addressing \"preparation for ADL transfer\",\"sitting balance for increased ADL performance\",\"sitting/activity tolerance\",\"functional reaching\",\"environmental safety/scanning\",\"fall prevention\",\"functional mobility for ADL transfers\",\"ability to sequence and follow directions\",\"functional UE strength\". Oral SILVIANO Gusman        Access Code: [email or web address]  URL: [email or web address]  Date: 09/13/2021  Prepared by:    Patient Education  Understanding Energy Conservation  Energy Conservation During Daily Tasks  How to Prevent Falls  Check for Safety

## 2021-09-13 NOTE — CARE COORDINATION
Social work: Patient to [employer] to [facility] via [address] [facility] [address]  at [place].  # for RN report: [phone]. Completed [place] faxed to [phone]. Informed RN, pt, and facility of dc time, agreeable to plan. [facility] faxed.

## 2021-09-13 NOTE — DISCHARGE SUMMARY
[facility]  Office: [phone]  Eddiemarbin Peña Blood DO, Heather Silva MD, Emir Scanlon MD, Wilbert Ramey MD, Dre Esqueda MD, Keron Kirkland MD, Trish Espino MD, Popeye Cason MD, Rishi Cooper MD, Mbonu Cassandria Favre MD, Gaby Ching DO, Ángel Savage MD, Rachna Wilson MD, Beata Pa DO, Gena Cortez MD,  Saintclair Lank DO, Magui Denny MD, Isabel Kaiser MD, Daryle Springer [facility], [facility] CNP, Wallyo Deb CNP, Geraldo Benoit CNS, Mia Leon CNP, Pal Martinez CNP, Nydia Soto CNP, Maria Luz Titus CNP, Dustin Saab CNP, Jaimee Begum PA-C, Sheridan Dennis CNP, Fuentes Graft CNP, [facility] CNP, Pam Colon CNP, Tess Choi CNP, Kyung Weaver, [facility]      Discharge Summary     Patient ID: Andrea Astudillo  :  1934   MRN: [id number]     ACCOUNT:  [de-identified]   Patient Location :   Patient's PCP: Antonia Acevedo MD  Admit Date: 2021   Discharge Date: 2021     Length of Stay: 0  Code Status:  Prior  Admitting Physician: Wilbert Ramey MD  Discharge Physician: Wilbert Ramey MD     Active Discharge Diagnosis :     Primary Problem  Repeated falls      [place] Problems    Diagnosis Date Noted    Spinal stenosis of lumbar region Hanna Werner 09/10/2021     Priority: High    Repeated falls [R29.6] 09/10/2021    Declining functional status [R53.81]     Fall [W19. XXXA]     Essential hypertension [I10]     Dyslipidemia [E78.5]     Chronic atrial fibrillation ([facility] [address] [I48.20]        Admission Condition:  fair     Discharged Condition: stable    Hospital Stay:     Hospital Course:  Andrea Astudillo is a 80 y.o. female who was admitted for the management of   Repeated falls , presented to ER with Ankle Pain (Rt ankle pain; no deformity noted)    Patient was complaining of hip pain and ankle pain on the right side.   She was complaining of overall Na 135 - 144 mmol/L 140 138 140 138 137     Lab Results   Component Value Date    ALT 30 12/06/2019    AST 27 12/06/2019    ALKPHOS 61 12/06/2019    BILITOT 0.70 12/06/2019     Lab Results   Component Value Date    TSH 2.49 12/06/2019     No results found for: HAV, HEPAIGM, HEPBIGM, HEPBCAB, HBEAG, HEPCAB  Lab Results   Component Value Date    COLORU STRAW 09/09/2021    NITRU NEGATIVE 09/09/2021    GLUCOSEU NEGATIVE 09/09/2021    KETUA TRACE 09/09/2021    UROBILINOGEN Normal 09/09/2021    BILIRUBINUR NEGATIVE 09/09/2021     Lab Results   Component Value Date    LABA1C 6.2 (H) 12/06/2019     Lab Results   Component Value Date     12/06/2019     Lab Results   Component Value Date    INR 2.3 09/12/2021    INR 2.5 09/10/2021    INR 1.5 09/09/2021    PROTIME 24.3 (H) 09/12/2021    PROTIME 26.4 (H) 09/10/2021    PROTIME 18.0 (H) 09/09/2021       XR ANKLE LEFT (MIN 3 VIEWS)    Result Date: 9/9/2021  Soft tissue swelling is identified at the ankle, though no fracture or dislocation is identified. XR ANKLE RIGHT (MIN 3 VIEWS)    Result Date: 9/10/2021  No acute findings. CT HEAD WO CONTRAST    Result Date: 9/10/2021  Cerebral atrophy without acute intracranial abnormality. CT Head WO Contrast    Result Date: 9/9/2021  Mild central and cortical cerebral atrophy. Mild chronic deep white matter ischemic changes No acute intracranial abnormalities are noted. CT CHEST WO CONTRAST    Result Date: 9/9/2021  1. Solid and subsolid noncalcified pulmonary nodules measuring up to 5 mm. These could be infectious or inflammatory, but short interval follow-up is recommended. 2. Cardiomegaly. Coronary artery atherosclerosis. RECOMMENDATIONS: Multiple pulmonary nodules. Most severe: 5 mm left ground-glass pulmonary nodule within the upper lobe. Recommend a non-contrast Chest CT at 3-6 months. If stable, consider follow-up non-contrast Chest CTs at 2 and 4 years.  These guidelines do not apply to immunocompromised patients and patients with cancer. Follow up in patients with significant comorbidities as clinically warranted. For lung cancer screening, adhere to Lung-RADS guidelines. Reference: Radiology. 2017; 284(1):228-43. CT Cervical Spine WO Contrast    Result Date: 9/9/2021  No acute bony abnormalities are noted. Multilevel lumbar spondylosis, facet arthropathy and degenerative disc disease as described above. Moderate central canal and lateral recess spinal stenosis at L3-4. Right foraminal stenosis at L2-3 and L3-4. RECOMMENDATIONS: Further evaluation may be obtained with MR imaging if clinically indicated. CT CERVICAL SPINE FINDINGS: The cervical spine demonstrates decreasedmineralization with  cervical lordosis. There is no evidence of acute fracture . Old fracture of the spinous process of C6. Grade 1 spondylolisthesis of C 4 with respect to C3. Heddy  There is loss of disc height with eburnation of the vertebral endplates at the A7-7, Q3-9, C5-6, C6-7 and C7-T1 levels. There are anterior and posterior marginal osteophytes at multiple levels. The central canal is grossly patent. There is bilateral facet hypertrophy at multiple levels throughout the cervical spine. The pedicles and posterior elements are otherwise intact. The prevertebral and paravertebral soft tissues are unremarkable. The atlanto-dens interval and dens are intact. The visualized lung apices are clear. Vascular calcifications are seen compatible with atherosclerotic disease. IMPRESSION: Multilevel cervical spondylosis and degenerative disc disease. Grade 1 spondylolisthesis of C 4 with respect to C3 which appears chronic RECOMMENDATIONS: Further evaluation should be obtained with MR imaging if clinically indicated. CT THORACIC SPINE WO CONTRAST    Result Date: 9/9/2021  No acute bony abnormalities are noted. Multilevel lumbar spondylosis, facet arthropathy and degenerative disc disease as described above.   Moderate central canal and lateral recess spinal stenosis at L3-4. Right foraminal stenosis at L2-3 and L3-4. RECOMMENDATIONS: Further evaluation may be obtained with MR imaging if clinically indicated. CT CERVICAL SPINE FINDINGS: The cervical spine demonstrates decreasedmineralization with  cervical lordosis. There is no evidence of acute fracture . Old fracture of the spinous process of C6. Grade 1 spondylolisthesis of C 4 with respect to C3. Priya Sharper There is loss of disc height with eburnation of the vertebral endplates at the X1-1, A2-9, C5-6, C6-7 and C7-T1 levels. There are anterior and posterior marginal osteophytes at multiple levels. The central canal is grossly patent. There is bilateral facet hypertrophy at multiple levels throughout the cervical spine. The pedicles and posterior elements are otherwise intact. The prevertebral and paravertebral soft tissues are unremarkable. The atlanto-dens interval and dens are intact. The visualized lung apices are clear. Vascular calcifications are seen compatible with atherosclerotic disease. IMPRESSION: Multilevel cervical spondylosis and degenerative disc disease. Grade 1 spondylolisthesis of C 4 with respect to C3 which appears chronic RECOMMENDATIONS: Further evaluation should be obtained with MR imaging if clinically indicated. CT LUMBAR SPINE WO CONTRAST    Result Date: 9/9/2021  No acute bony abnormalities are noted. Multilevel lumbar spondylosis, facet arthropathy and degenerative disc disease as described above. Moderate central canal and lateral recess spinal stenosis at L3-4. Right foraminal stenosis at L2-3 and L3-4. RECOMMENDATIONS: Further evaluation may be obtained with MR imaging if clinically indicated. CT CERVICAL SPINE FINDINGS: The cervical spine demonstrates decreasedmineralization with  cervical lordosis. There is no evidence of acute fracture . Old fracture of the spinous process of C6. Grade 1 spondylolisthesis of C 4 with respect to C3. Priya Sharper  There is loss of disc height with eburnation of the vertebral endplates at the W3-1, P6-4, C5-6, C6-7 and C7-T1 levels. There are anterior and posterior marginal osteophytes at multiple levels. The central canal is grossly patent. There is bilateral facet hypertrophy at multiple levels throughout the cervical spine. The pedicles and posterior elements are otherwise intact. The prevertebral and paravertebral soft tissues are unremarkable. The atlanto-dens interval and dens are intact. The visualized lung apices are clear. Vascular calcifications are seen compatible with atherosclerotic disease. IMPRESSION: Multilevel cervical spondylosis and degenerative disc disease. Grade 1 spondylolisthesis of C 4 with respect to C3 which appears chronic RECOMMENDATIONS: Further evaluation should be obtained with MR imaging if clinically indicated. CT ABDOMEN PELVIS W IV CONTRAST Additional Contrast? None    Result Date: 9/9/2021  There is a small foci of gas within the bladder and minimal adjacent stranding. Findings concerning for cystitis in the absence of recent instrumentation. Clinical correlation recommended. Mild interstitial thickening in the visualized lung bases may be related to mild pulmonary edema. Clinical correlation recommended. Moderate hiatal hernia. XR CHEST PORTABLE    Result Date: 9/9/2021  No acute cardiopulmonary disease             Consultations:    Consults:     Final Specialist Recommendations/Findings:   IP CONSULT TO HOSPITALIST  IP CONSULT TO SOCIAL WORK      The patient was seen and examined on day of discharge and this discharge summary is in conjunction with any daily progress note from day of discharge. Discharge plan:     Disposition: SNF     Physician Follow Up:   Medication as advised. Follow up with PCP   Chel Hodges MD    No follow-up provider specified. Requiring Further Evaluation/Follow Up POST HOSPITALIZATION/Incidental Findings: PT OT . Fall precautions.      Diet: cardiac diet    Activity: As tolerated. Instructions to Patient: medication as advised . Discharge Medications:      Medication List      START taking these medications    cephALEXin 500 MG capsule  Commonly known as: KEFLEX  Take 1 capsule by mouth every 12 hours for 5 days        CHANGE how you take these medications    lisinopril 20 MG tablet  Commonly known as: PRINIVIL;ZESTRIL  Take 1 tablet by mouth daily  What changed: medication strength        CONTINUE taking these medications    atorvastatin 20 MG tablet  Commonly known as: LIPITOR  Take 1 tablet by mouth nightly     mirabegron 50 MG Tb24  Commonly known as: MYRBETRIQ     venlafaxine 75 MG tablet  Commonly known as: EFFEXOR     verapamil 240 MG extended release tablet  Commonly known as: CALAN SR  Take 1 tablet by mouth nightly     Xarelto 20 MG Tabs tablet  Generic drug: rivaroxaban        STOP taking these medications    doxycycline 50 MG capsule  Commonly known as: VIBRAMYCIN     hydroCHLOROthiazide 25 MG tablet  Commonly known as: HYDRODIURIL           Where to Get Your Medications      Information about where to get these medications is not yet available    Ask your nurse or doctor about these medications  · cephALEXin 500 MG capsule  · lisinopril 20 MG tablet         Time Spent on discharge is  32 mins in patient examination, evaluation, counseling as well as medication reconciliation, prescriptions for required medications, discharge plan and follow up. Electronically signed by   Neil Mckee MD  9/13/2021        Thank you Dr. Geremias Norris MD for the opportunity to be involved in this patient's care.

## 2022-04-05 NOTE — PROGRESS NOTES
Occupational Therapy  Facility/Department: [facility]  Daily Treatment Note  NAME: Ilsa Alexandre  : 1934  MRN: [id number]    Date of Service: 2021    Discharge Recommendations:  Patient would benefit from continued therapy after discharge       Assessment   Neurological  Neurological (WDL): Within Defined Limits  Performance deficits / Impairments: Decreased functional mobility ; Decreased safe awareness;Decreased balance;Decreased coordination;Decreased ADL status; Decreased cognition;Decreased vision/visual deficit; Decreased posture;Decreased endurance;Decreased high-level IADLs;Decreased strength;Decreased fine motor control  Assessment: At this time, pt is a high fall risk and requires max assist of 2 in harvey atedy lift for balance support/safety. Skilled OT is indicated to maximize functional I and safety as able to return home with assist as needed. Prognosis: Fair  Decision Making: High Complexity  OT Education: OT Role;Plan of Care;ADL Adaptive Strategies;Transfer Training  REQUIRES OT FOLLOW UP: Yes  Activity Tolerance  Activity Tolerance: Patient limited by fatigue;Patient limited by pain;Treatment limited secondary to decreased cognition  Activity Tolerance: poor/plus; pt unable to moreno weight through RLE with standing  Safety Devices  Safety Devices in place: Yes  Type of devices: Call light within reach; Patient at risk for falls;Nurse notified; Left in bed;Bed alarm in place; All fall risk precautions in place         Patient Diagnosis(es): The primary encounter diagnosis was Fall, initial encounter. Diagnoses of Acute right ankle pain, Generalized weakness, and Frequent falls were also pertinent to this visit. has a past medical history of Atrial fibrillation ([facility] [address] CAD (coronary artery disease), Dementia ([facility] [address] Hyperlipidemia, Hypertension, and S/P cardiac cath.    has a past surgical history that includes pacemaker placement; Colonoscopy; and ablation of dysrhythmic focus. Restrictions  Restrictions/Precautions  Restrictions/Precautions: General Precautions, Fall Risk  Implants present? : Pacemaker  Position Activity Restriction  Other position/activity restrictions: Up with assist, alarms, RUE IV, telesitter  Subjective   General  Chart Reviewed: Yes  Patient assessed for rehabilitation services?: Yes  Family / Caregiver Present: No  Pain Assessment  Pain Assessment: Faces  Pain Level: 0  Holden-You Pain Rating: Hurts little more  Pain Location: Ankle;Knee  Pain Orientation: Right;Posterior  Vital Signs  Patient Currently in Pain: Yes   Orientation  Orientation  Overall Orientation Status: Within Functional Limits  Objective    ADL  Feeding: Setup  Grooming: Setup;Minimal assistance (brush teeth, wash face, combhair sitting on EOB)        Balance  Sitting Balance: Contact guard assistance  Standing Balance: Minimal assistance (cues for hand placement on bed for increase stability)  Functional Mobility  Functional Mobility Comments: pt tolerated minimal amount of weight on Rt LE due to ankle and posterior knee pain. Bed mobility  Rolling to Left: Minimal assistance  Rolling to Right: Minimal assistance  Supine to Sit: Minimal assistance;2 Person assistance  Sit to Supine: Moderate assistance;2 Person assistance  Scooting: Moderate assistance  Comment: needed verbal and physical cues to scoot sitting in straight position to safely complete ADL. Transfers  Sit to stand: Moderate assistance;2 Person assistance  Stand to sit: Moderate assistance;2 Person assistance  Transfer Comments:  With harvey jean requires prompts for hand and foot position                              Perception  Overall Perceptual Status: [facility]                                   Plan   Plan  Times per week: 4-5x/week 1x/day as moreno  Current Treatment Recommendations: Strengthening, Balance Training, Functional Mobility Training, Safety Education & Training, Positioning, Home Management Training, Cognitive/Perceptual Training, Pain Management, Equipment Evaluation, Education, & procurement, Self-Care / ADL, Endurance Training, Neuromuscular Re-education, Patient/Caregiver Education & Training  G-Code     OutComes Score                                                  AM-PAC Score        AM-PAC Inpatient Daily Activity Raw Score: 13 (09/11/21 1333)  AM-PAC Inpatient ADL T-Scale Score : 32.03 (09/11/21 1333)  ADL Inpatient CMS 0-100% Score: 63.03 (09/11/21 1333)  ADL Inpatient CMS G-Code Modifier : CL (09/11/21 1333)    Goals  Short term goals  Time Frame for Short term goals: by discharge, pt to demo  Short term goal 1: bed mob tasks with use of rail as needed to SBA. Short term goal 2: increase BUE strength by a 1/2 grade to assist with ADL's. Short term goal 3: UB ADL to set up and LB ADL to max assist of 1 and use of AD/bed rail as needed. Short term goal 4: ADL transfers with use of lift/AD as needed to max assist of 1(add functional mob goal as appropriate)  Short term goal 5: toileting with use of BSC/lift and AD as needed to max assist of 1. Long term goals  Long term goal 1: Pt to stand with min assist and AD moreno > 5 mins as able to reduce falls in function. Long term goal 2: Caregivers to be I with pressure relief, BUE HEP, fall prevention, EC/WS tech and DME/AE and AD recommendations with use of handouts. Patient Goals   Patient goals : After 3 choices given pt stated to be able to walk better!        Therapy Time   Individual Concurrent Group Co-treatment   Time In  930         Time Out  1007         Minutes  37 minutes tx                 Massimo Doll, OT Arava Counseling:  Patient counseled regarding adverse effects of Arava including but not limited to nausea, vomiting, abnormalities in liver function tests. Patients may develop mouth sores, rash, diarrhea, and abnormalities in blood counts. The patient understands that monitoring is required including LFTs and blood counts.  There is a rare possibility of scarring of the liver and lung problems that can occur when taking methotrexate. Persistent nausea, loss of appetite, pale stools, dark urine, cough, and shortness of breath should be reported immediately. Patient advised to discontinue Arava treatment and consult with a physician prior to attempting conception. The patient will have to undergo a treatment to eliminate Arava from the body prior to conception.